# Patient Record
Sex: FEMALE | Race: WHITE | NOT HISPANIC OR LATINO | Employment: FULL TIME | URBAN - METROPOLITAN AREA
[De-identification: names, ages, dates, MRNs, and addresses within clinical notes are randomized per-mention and may not be internally consistent; named-entity substitution may affect disease eponyms.]

---

## 2019-07-16 ENCOUNTER — HOSPITAL ENCOUNTER (OUTPATIENT)
Dept: MAMMOGRAPHY | Facility: HOSPITAL | Age: 49
Discharge: HOME | End: 2019-07-16

## 2019-08-06 ENCOUNTER — HOSPITAL ENCOUNTER (OUTPATIENT)
Dept: ULTRASOUND IMAGING | Facility: HOSPITAL | Age: 49
Discharge: HOME | End: 2019-08-06

## 2020-09-08 ENCOUNTER — OFFICE VISIT CONVERTED (OUTPATIENT)
Dept: FAMILY MEDICINE CLINIC | Age: 50
End: 2020-09-08
Attending: NURSE PRACTITIONER

## 2020-10-19 ENCOUNTER — OFFICE VISIT CONVERTED (OUTPATIENT)
Dept: FAMILY MEDICINE CLINIC | Age: 50
End: 2020-10-19
Attending: NURSE PRACTITIONER

## 2020-10-30 ENCOUNTER — CONVERSION ENCOUNTER (OUTPATIENT)
Dept: FAMILY MEDICINE CLINIC | Age: 50
End: 2020-10-30

## 2020-10-31 LAB
ALBUMIN SERPL-MCNC: 4.3 G/DL
ALBUMIN/GLOB SERPL: 1.5 {RATIO}
ALP SERPL-CCNC: 77 IU/L
ALT SERPL-CCNC: 16 IU/L
AST SERPL-CCNC: 25 IU/L
BASOPHILS # BLD AUTO: 0 X10E3/UL
BASOPHILS NFR BLD AUTO: 1 %
BILIRUB SERPL-MCNC: 0.6 MG/DL
BUN SERPL-MCNC: 13 MG/DL
BUN/CREAT SERPL: 14
CALCIUM SERPL-MCNC: 9.5 MG/DL
CHLORIDE SERPL-SCNC: 102 MMOL/L
CHOLEST SERPL-MCNC: 202 MG/DL
CO2 SERPL-SCNC: 24 MMOL/L
CONV IMMATURE GRAN: 0 %
CONV TOTAL PROTEIN: 7.1 G/DL
CREAT UR-MCNC: 0.96 MG/DL
EOSINOPHIL # BLD AUTO: 0.3 X10E3/UL
EOSINOPHIL # BLD AUTO: 4 %
ERYTHROCYTE [DISTWIDTH] IN BLOOD BY AUTOMATED COUNT: 13.1 %
GLOBULIN UR ELPH-MCNC: 2.8 G/DL
GLUCOSE SERPL-MCNC: 86 MG/DL
HCT VFR BLD AUTO: 37.6 %
HDLC SERPL-MCNC: 73 MG/DL
HGB BLD-MCNC: 12.5 G/DL
IMM GRANULOCYTES # BLD: 0 X10E3/UL
LDLC SERPL CALC-MCNC: 114 MG/DL
LYMPHOCYTES # BLD AUTO: 2.5 X10E3/UL
LYMPHOCYTES NFR BLD AUTO: 38 %
MCH RBC QN AUTO: 27.7 PG
MCHC RBC AUTO-ENTMCNC: 33.2 G/DL
MCV RBC AUTO: 83 FL
MONOCYTES # BLD AUTO: 0.4 X10E3/UL
MONOCYTES NFR BLD AUTO: 7 %
NEUTROPHILS # BLD AUTO: 3.3 X10E3/UL
NEUTROPHILS NFR BLD AUTO: 50 %
PLATELET # BLD AUTO: 292 X10E3/UL
POTASSIUM SERPL-SCNC: 4.2 MMOL/L
RBC # BLD AUTO: 4.51 X10E6/UL
SODIUM SERPL-SCNC: 138 MMOL/L
TRIGL SERPL-MCNC: 82 MG/DL
TSH SERPL-ACNC: 1.21 UIU/ML
VLDLC SERPL-MCNC: 15 MG/DL
WBC # BLD AUTO: 6.5 X10E3/UL

## 2021-05-18 NOTE — PROGRESS NOTES
Josefina Nunez  1970     Office/Outpatient Visit    Visit Date: Mon, Oct 19, 2020 11:11 am    Provider: Amara Garcia N.P. (Assistant: Latha Varner MA)    Location: [facility]        Electronically signed by Amara Garcia N.P. on  10/19/2020 02:42:14 PM                             Subjective:        CC: Ms. Nunez is a 50 year old White female.  This is a follow-up visit.  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  Visit today is because of worsening symptoms.  The diagnosis of depression was made several years ago.  feels like she continues to have problems with her depression/ anxiety  thinks it is mostly due to stress from her job and the current covid situation.  She was previously placed on Wwellbutrin, but has not seen any improvement in her symtpoms.  Would like to try an adjunct , but is concerned with sexual side effects that the other medications seem to cause           Migraine without aura, not intractable, without status migrainosus details; josefina would like to get back on topirimate for her migraines.  She was previously on a nightly Rx which she felt worked well.  She is unsure if this was 25 or 50mg  but she has been having increasing problems with her migraines again.      ROS:     CONSTITUTIONAL:  Positive for fatigue.   Negative for chills or fever.      CARDIOVASCULAR:  Positive for palpitations ( with stress ).   Negative for chest pain or pedal edema.      RESPIRATORY:  Negative for recent cough and dyspnea.      NEUROLOGICAL:  Positive for headaches ( migraine ).   Negative for dizziness.      PSYCHIATRIC:  Positive for anxiety, depression, feelings of stress, mood swings, difficulty concentrating and sleep disturbance.   Negative for suicidal thoughts.          Past Medical History / Family History / Social History:         Last Reviewed on 9/08/2020 02:43 PM by  Amara Garcia    Past Medical History:                 PAST MEDICAL HISTORY             GYNECOLOGICAL HISTORY:             PREVENTIVE HEALTH MAINTENANCE             COLORECTAL CANCER SCREENING: never  and would like one now     DENTAL CLEANING: was last done      EYE EXAM: was last done      MAMMOGRAM: was last done 2017 fibrocystic breast  - [facility] (tyicallyrequires )     PAP SMEAR: was last done  with the following abnormalaties noted-- colposcopy - last normal Dr. Yanes         Surgical History:         Bilateral Tubal Ligation Procedures: UTERINE ABLATION     Cholecystectomy: laparoscopic; [facility];         Family History:         Positive for Myocardial Infarction ( mat. GM ).      Positive for Brain Tumor ( mat. GM ), Breast Cancer ( mat. GM ), Colon Cancer ( father --  at 71 ) and uterine / bladder cancer  (mother).          Social History:     Occupation: [employer]     Children: 1 child         Tobacco/Alcohol/Supplements:     Last Reviewed on 2020 02:43 PM by Amara Garcia    Tobacco: She has a past history of cigarette smoking; quit date:  4-5 years ago.          Alcohol: Frequency:    RARE;     Caffeine:  She admits to consuming caffeine via coffee ( 1 serving per day ).          Substance Abuse History:     Last Reviewed on 2020 02:43 PM by Amara Garcia    NEGATIVE         Mental Health History:     Last Reviewed on 2020 02:43 PM by Amara Garcia        Generalized Anxiety Disorder     Major Depression         Communicable Diseases (eg STDs):     Last Reviewed on 2020 02:43 PM by Amara Garcia    Reportable health conditions; NEGATIVE         Current Problems:     Last Reviewed on 2020 02:43 PM by Amara Garcia    Generalized anxiety disorder    Migraine without aura, not intractable, without status migrainosus    Insomnia, unspecified    Calculus of kidney    Encounter for screening for malignant neoplasm of colon     Encounter for screening mammogram for malignant neoplasm of breast    Encounter for screening for depression    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, unspecified        Immunizations:     PPD 2/11/2009        Allergies:     Last Reviewed on 10/19/2020 11:16 AM by Latha Varner    Sulfonamides:      Imitrex:      Morphine Sulfate: Rash     Codeine Sulfate: Rash         Current Medications:     Last Reviewed on 10/19/2020 11:16 AM by Latha Varner    black cohosh  [430mg daily]    cyclobenzaprine 10 mg oral tablet [take 1/2 - 1 tablet (10 mg) by oral route HS prn]    buPROPion  mg oral Tablet, Extended Release 24 hr [take 1 tablet (150 mg) by oral route once daily]        Objective:        Vitals:         Current: 10/19/2020 11:18:24 AM    Ht:  5 ft, 5 in;  Wt: 124.8 lbs;  BMI: 20.8T: 97.4 F (temporal);  BP: 118/70 mm Hg (right arm, sitting);  P: 73 bpm (right arm (BP Cuff), sitting)        Exams:     PHYSICAL EXAM:     GENERAL: vital signs recorded - well developed, well nourished;  no apparent distress;     EYES: PERRL, EOMI     RESPIRATORY: normal appearance and symmetric expansion of chest wall; normal respiratory rate and pattern with no distress; normal breath sounds with no rales, rhonchi, wheezes or rubs;     CARDIOVASCULAR: normal rate; rhythm is regular;  no edema;     MUSCULOSKELETAL: normal gait; normal range of motion of all major muscle groups; no limb or joint pain with range of motion;     NEUROLOGIC: mental status: alert and oriented x 3;     PSYCHIATRIC: appropriate affect and demeanor; normal speech pattern; normal thought and perception;         Assessment:         F06.30   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, unspecified       G43.009   Migraine without aura, not intractable, without status migrainosus       Z13.6   Encounter for screening for cardiovascular disorders           ORDERS:         Meds Prescribed:       [New Rx] DULoxetine 20 mg oral  capsule,delayed release (enteric coated) [take 1 capsule (20 mg) by oral route daily], #90 (ninety) capsules, Refills: 0 (zero)       [New Rx] topiramate 25 mg oral tablet [take 1 tablet (25 mg) by oral route once daily HS], #90 (ninety) tablets, Refills: 0 (zero)       [Refilled] buPROPion  mg oral Tablet, Extended Release 24 hr [take 1-2  tablet (150 mg) by oral route once daily], #180 (one hundred and eighty) tablets, Refills: 0 (zero)         Lab Orders:       [id number]  [facility] PHYSICAL: CMP, CBC, TSH, LIPID: [id number], [id number], [id number]  (Send-Out)                      Plan:         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, unspecifiedinstructed to try to increase the wellbutrin to 300mg  if ineffective, would recommend that she take the duloxetine in addition to wellbutrin to see if this will improvefollow up if no improvement in 4-6 weeks          Prescriptions:       [New Rx] DULoxetine 20 mg oral capsule,delayed release (enteric coated) [take 1 capsule (20 mg) by oral route daily], #90 (ninety) capsules, Refills: 0 (zero)       [Refilled] buPROPion  mg oral Tablet, Extended Release 24 hr [take 1-2  tablet (150 mg) by oral route once daily], #180 (one hundred and eighty) tablets, Refills: 0 (zero)         Migraine without aura, not intractable, without status migrainosusresume topirimate daily at 25mg at HS          Prescriptions:       [New Rx] topiramate 25 mg oral tablet [take 1 tablet (25 mg) by oral route once daily HS], #90 (ninety) tablets, Refills: 0 (zero)         Encounter for screening for cardiovascular disorders    LABORATORY:  Labs ordered to be performed today include PHYSICAL PANEL; CMP, CBC, TSH, LIPID.            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

## 2021-05-18 NOTE — PROGRESS NOTES
Josefina Nunez  1970     Office/Outpatient Visit    Visit Date: Tue, Sep 8, 2020 02:15 pm    Provider: Amara Garcia N.P. (Assistant: Melvi Shane RN)    Location: [facility]        Electronically signed by Amara Garcia N.P. on  09/10/2020 10:39:07 AM                             Subjective:        CC: Ms. Nunez is a 50 year old White female.  This is her first visit to the clinic.  Est New PCP; PT IS NOT TAKING FLUOXETINE        HPI:           PHQ-9 Depression Screening: Completed form scanned and in chart; Total Score 8           Generalized anxiety disorder details; her anxiety disorder was originally diagnosed many years ago.  Her symptom complex includes palpitations.  True panic attacks occur in addition to generalized anxiety.  The frequency symptoms is circumstantial (phobia-related).  Apparent triggers include occupational stressors.  She is not currently being treated for anxiety.  Previous attempts at treatment have included an antidepressant and Wellbutrin.            Complaint of calculus of kidney..  no stones for some time       feels like due to menopausal symtpoms  was previously on wellbutrin 150 daily  would like to resume    ROS:     CONSTITUTIONAL:  Positive for fatigue ( feels like for menopause ).   Negative for chills or fever.      CARDIOVASCULAR:  Negative for chest pain and pedal edema.      RESPIRATORY:  Negative for recent cough and dyspnea.      GASTROINTESTINAL:  Positive for abdominal pain ( LLQ ), abdominal bloating ( after eating ), constipation ( starteda bout 2-3 months ago ) and hematochezia ( nothing recently  but has had in the past ).   Negative for diarrhea, heartburn, nausea or vomiting.      GENITOURINARY:  Negative for dysuria and change in urine stream.      NEUROLOGICAL:  Negative for dizziness, fainting, headaches and paresthesias.      ENDOCRINE:  Positive for hot flashes.   Negative for hair loss, polydipsia or polyphagia.       ALLERGIC/IMMUNOLOGIC:  Negative for seasonal allergies.      PSYCHIATRIC:  Positive for anxiety and sleep disturbance.   Negative for depression or suicidal thoughts.          Past Medical History / Family History / Social History:         Last Reviewed on 2020 02:43 PM by Amara Garcia    Past Medical History:                 PAST MEDICAL HISTORY             GYNECOLOGICAL HISTORY:             PREVENTIVE HEALTH MAINTENANCE             COLORECTAL CANCER SCREENING: never  and would like one now     DENTAL CLEANING: was last done      EYE EXAM: was last done      MAMMOGRAM: was last done  fibrocystic breast  - [facility] ([facility])     PAP SMEAR: was last done  with the following abnormalaties noted-- colposcopy - last normal Dr. Yanes         Surgical History:         Bilateral Tubal Ligation Procedures: UTERINE ABLATION     Cholecystectomy: laparoscopic; [facility];         Family History:         Positive for Myocardial Infarction ( mat. GM ).      Positive for Brain Tumor ( mat. GM ), Breast Cancer ( mat. GM ), Colon Cancer ( father --  at 71 ) and uterine / bladder cancer  (mother).          Social History:     Occupation: [employer]     Children: 1 child         Tobacco/Alcohol/Supplements:     Last Reviewed on 2020 02:43 PM by Amara Garcia    Tobacco: She has a past history of cigarette smoking; quit date:  4-5 years ago.          Alcohol: Frequency:    RARE;     Caffeine:  She admits to consuming caffeine via coffee ( 1 serving per day ).          Substance Abuse History:     Last Reviewed on 2020 02:43 PM by Amara Garcia    NEGATIVE         Mental Health History:     Last Reviewed on 2020 02:43 PM by Amara Garcia        Generalized Anxiety Disorder     Major Depression         Communicable Diseases (eg STDs):     Last Reviewed on 2020 02:43 PM by Amara Garcia    Reportable health conditions; NEGATIVE          Current Problems:     Last Reviewed on 9/08/2020 02:43 PM by Amara Garcia    Generalized anxiety disorder    Migraine without aura, not intractable, without status migrainosus    Insomnia, unspecified    Calculus of kidney    Encounter for screening for malignant neoplasm of colon    Encounter for screening mammogram for malignant neoplasm of breast    Encounter for screening for depression    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, unspecified        Immunizations:     PPD 2/11/2009        Allergies:     Last Reviewed on 9/08/2020 02:43 PM by Amara Garcia    Sulfonamides:      Imitrex:      Morphine Sulfate: Rash     Codeine Sulfate: Rash         Current Medications:     Last Reviewed on 9/08/2020 02:43 PM by Amara Garcia    black cohosh  [430mg daily]        Objective:        Vitals:         Current: 9/8/2020 2:21:12 PM    Ht:  5 ft, 5 in;  Wt: 122.4 lbs;  BMI: 20.4T: 97.6 F (temporal);  BP: 117/82 mm Hg (left arm, sitting);  P: 71 bpm (left arm (BP Cuff), sitting)        Exams:     PHYSICAL EXAM:     GENERAL: vital signs recorded - well developed, well nourished;  no apparent distress;     RESPIRATORY: normal appearance and symmetric expansion of chest wall; normal respiratory rate and pattern with no distress; normal breath sounds with no rales, rhonchi, wheezes or rubs;     CARDIOVASCULAR: normal rate; rhythm is regular;  no edema;     GASTROINTESTINAL: mild epigastric tenderness;     MUSCULOSKELETAL: normal gait; normal range of motion of all major muscle groups; no limb or joint pain with range of motion;     NEUROLOGIC: mental status: alert and oriented x 3;     PSYCHIATRIC: appropriate affect and demeanor; normal speech pattern; normal thought and perception;         Assessment:         Z13.31   Encounter for screening for depression       F41.1   Generalized anxiety disorder       N20.0   Calculus of kidney       F06.30   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,  unspecified       Z12.31   Encounter for screening mammogram for malignant neoplasm of breast       Z12.11   Encounter for screening for malignant neoplasm of colon       G47.00   Insomnia, unspecified           ORDERS:         Meds Prescribed:       [New Rx] cyclobenzaprine 10 mg oral tablet [take 1/2 - 1 tablet (10 mg) by oral route HS prn], #20 (twenty) tablets, Refills: 0 (zero)       [New Rx] buPROPion  mg oral Tablet, Extended Release 24 hr [take 1 tablet (150 mg) by oral route once daily], #30 (thirty) tablets, Refills: 2 (two)         Radiology/Test Orders:       [id number]  Screening mammography bi 2-view inc CAD  (Send-Out)              Procedures Ordered:       REFER  Referral to Specialist or Other Facility  (Send-Out)              Other Orders:         Depression screen positive and follow up plan documented  (In-House)                      Plan:         Encounter for screening for depression    MIPS Positive Depression Screen: Pharmacologic intervention initiated/modified           Orders:         Depression screen positive and follow up plan documented  (In-House)              Generalized anxiety disordermedication as prescribed - follow up if no improvement and may need alternative        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            Orders:       [id number]  Screening mammography bi 2-view inc CAD  (Send-Out)              Encounter for screening for malignant neoplasm of colon        REFERRALS:  Referral initiated to a general surgeon ( a colonoscopy ).            Orders:       REFER  Referral to Specialist or Other Facility  (Send-Out)               Insomnia, unspecifiedwill treat with PRN - has tried trazodone, ambien , hydroxyzine - no benefit          Prescriptions:       [New Rx] cyclobenzaprine 10 mg oral tablet [take 1/2 - 1 tablet (10 mg) by oral route HS prn], #20 (twenty) tablets, Refills: 0 (zero)             Charge Capture:         Primary Diagnosis:     Z13.31  Encounter for screening for depression           Orders:      [id number]  Office visit - new pt, level 3  (In-House)              Depression screen positive and follow up plan documented  (In-House)              F41.1  Generalized anxiety disorder     N20.0  Calculus of kidney     F06.30  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

## 2021-06-24 RX ORDER — BUPROPION HYDROCHLORIDE 150 MG/1
150 TABLET, EXTENDED RELEASE ORAL DAILY
COMMUNITY
End: 2021-06-28 | Stop reason: SDUPTHER

## 2021-06-24 RX ORDER — DULOXETIN HYDROCHLORIDE 20 MG/1
20 CAPSULE, DELAYED RELEASE ORAL DAILY
COMMUNITY
Start: 2021-03-28 | End: 2021-06-28 | Stop reason: SDUPTHER

## 2021-06-24 RX ORDER — TOPIRAMATE 25 MG/1
25 TABLET ORAL NIGHTLY
COMMUNITY
End: 2021-06-28 | Stop reason: SDUPTHER

## 2021-06-24 RX ORDER — CYCLOBENZAPRINE HCL 10 MG
TABLET ORAL
COMMUNITY
Start: 2021-05-23 | End: 2021-06-28 | Stop reason: SDUPTHER

## 2021-06-24 NOTE — TELEPHONE ENCOUNTER
Bupropion  10/19/20 #180    topiramate LF 10/19/20 #90    Cyclobenzaprine LF 3/7/21 #20    LOV 10/19/20   APPT 6/29/21

## 2021-06-28 RX ORDER — TOPIRAMATE 25 MG/1
TABLET ORAL
Qty: 90 TABLET | Refills: 0 | Status: SHIPPED | OUTPATIENT
Start: 2021-06-28 | End: 2021-08-03

## 2021-06-28 RX ORDER — CYCLOBENZAPRINE HCL 10 MG
TABLET ORAL
Qty: 20 TABLET | Refills: 0 | Status: SHIPPED | OUTPATIENT
Start: 2021-06-28 | End: 2021-08-03

## 2021-06-28 RX ORDER — BUPROPION HYDROCHLORIDE 150 MG/1
TABLET ORAL
Qty: 180 TABLET | Refills: 0 | Status: SHIPPED | OUTPATIENT
Start: 2021-06-28 | End: 2021-08-03

## 2021-06-28 RX ORDER — DULOXETIN HYDROCHLORIDE 20 MG/1
20 CAPSULE, DELAYED RELEASE ORAL DAILY
Qty: 30 CAPSULE | Refills: 0 | Status: SHIPPED | OUTPATIENT
Start: 2021-06-28 | End: 2021-08-03

## 2021-06-29 ENCOUNTER — OFFICE VISIT (OUTPATIENT)
Dept: FAMILY MEDICINE CLINIC | Age: 51
End: 2021-06-29

## 2021-06-29 VITALS
HEART RATE: 89 BPM | DIASTOLIC BLOOD PRESSURE: 76 MMHG | HEIGHT: 65 IN | TEMPERATURE: 98.4 F | SYSTOLIC BLOOD PRESSURE: 123 MMHG | WEIGHT: 129 LBS | BODY MASS INDEX: 21.49 KG/M2

## 2021-06-29 DIAGNOSIS — M25.50 ARTHRALGIA, UNSPECIFIED JOINT: ICD-10-CM

## 2021-06-29 DIAGNOSIS — K58.1 IRRITABLE BOWEL SYNDROME WITH CONSTIPATION: Primary | ICD-10-CM

## 2021-06-29 DIAGNOSIS — G47.00 INSOMNIA, UNSPECIFIED TYPE: ICD-10-CM

## 2021-06-29 PROCEDURE — 99214 OFFICE O/P EST MOD 30 MIN: CPT | Performed by: NURSE PRACTITIONER

## 2021-06-29 RX ORDER — DICLOFENAC SODIUM 75 MG/1
75 TABLET, DELAYED RELEASE ORAL 2 TIMES DAILY
Qty: 60 TABLET | Refills: 0 | Status: SHIPPED | OUTPATIENT
Start: 2021-06-29 | End: 2021-08-03

## 2021-06-29 RX ORDER — HYDROXYZINE 50 MG/1
50 TABLET, FILM COATED ORAL NIGHTLY
Qty: 30 TABLET | Refills: 1 | Status: SHIPPED | OUTPATIENT
Start: 2021-06-29 | End: 2021-10-21

## 2021-06-29 NOTE — PATIENT INSTRUCTIONS
Please make sure to schedule a Wellness visit each year with your primary care provider to discuss health screenings, immunization recommendations, routine lab analysis and general health recommendations.   Most insurance covers an annual wellness visit at no cost to you one time per year.  Please call and schedule yours today!

## 2021-06-29 NOTE — PROGRESS NOTES
"Chief Complaint  Constipation and Abdominal Pain    Subjective          Josefina Nunez presents to [facility]   Presents today for problems with long standing constipation.  She feels she may have irritable bowel. She has tried multiple OTC treatment with no improvement long term.  She is requesting linzess.     Diffuse joint pain - mostly in hands and hips.  Morning stiffness lasting 30 min but improves with movement Has tried tylenol, Ibuprofen but only short term      Has also been having problems with sleep. She has taken melatonin, tried the flexeril but nothing is helping her to stay asleep.     Review of Systems   Constitutional: Negative for fatigue and fever.   Respiratory: Negative for shortness of breath.    Cardiovascular: Negative for chest pain.   Gastrointestinal: Positive for abdominal distention (bloating) and constipation (has been going on for months).   Musculoskeletal: Positive for arthralgias (diffuse).   Psychiatric/Behavioral: Negative for depressed mood. The patient is not nervous/anxious.          Health Maintenance Due   Topic Date Due   • COVID-19 Vaccine (1) Never done   • TDAP/TD VACCINES (1 - Tdap) Never done   • ZOSTER VACCINE (1 of 2) Never done   • HEPATITIS C SCREENING  Never done   • ANNUAL WELLNESS VISIT  Never done   • PAP SMEAR  Never done        Objective     Vital Signs:   /76 (BP Location: Right arm, Patient Position: Sitting)   Pulse 89   Temp 98.4 °F (36.9 °C)   Ht 165.1 cm (65\")   Wt 58.5 kg (129 lb)   BMI 21.47 kg/m²       Physical Exam  Constitutional:       General: She is not in acute distress.     Appearance: Normal appearance.   HENT:      Head: Normocephalic.   Cardiovascular:      Rate and Rhythm: Normal rate and regular rhythm.   Pulmonary:      Effort: Pulmonary effort is normal.      Breath sounds: Normal breath sounds.   Abdominal:      General: Bowel sounds are decreased.      Tenderness: There is no abdominal " tenderness.   Musculoskeletal:         General: Normal range of motion.   Neurological:      General: No focal deficit present.      Mental Status: She is alert and oriented to person, place, and time.   Psychiatric:         Mood and Affect: Mood normal.         Behavior: Behavior normal.          Result Review :                      Assessment and Plan    Diagnoses and all orders for this visit:    1. Irritable bowel syndrome with constipation (Primary)  -     linaclotide (Linzess) 72 MCG capsule capsule; Take 1 capsule by mouth Every Morning Before Breakfast for 90 days.  Dispense: 30 capsule; Refill: 1    2. Insomnia, unspecified type  -     hydrOXYzine (ATARAX) 50 MG tablet; Take 1 tablet by mouth Every Night for 30 days. 1/2 -1 tab HS PRN insomnia  Dispense: 30 tablet; Refill: 1    3. Arthralgia, unspecified joint  -     diclofenac (VOLTAREN) 75 MG EC tablet; Take 1 tablet by mouth 2 (Two) Times a Day for 30 days.  Dispense: 60 tablet; Refill: 0            Follow Up    Return in about 6 weeks (around 8/10/2021).      Patient was given instructions and counseling regarding her condition or for health maintenance advice. Please see specific information pulled into the AVS if appropriate.

## 2021-06-30 NOTE — PROGRESS NOTES
Subjective   Josefina Nunez is a 51 y.o. female.     Presents with long standing history of constipation which she suspects is Irritable bowel with constipation.  She reports bloating, chronic constipation with weight gain over the past months.  She has tried multiple OTC treatments to help regulate, with no success.  She is requesting a trial of linzess.     She also reports diffuse joint pain.  This has been ongoing for 2-3 months.  Tried OTC ibuprofen, tylenol with little relief.  She does report morning stiffness that resolves after activity.      Has also noticed that her sleeping medication is not effective any longer.  She is finding it difficult to remain asleep. She will fall asleep, but generally wakes up early in the night     Constipation  Associated symptoms include abdominal pain.   Abdominal Pain  Associated symptoms include arthralgias (diffuse) and constipation.        {Common H&P Review Areas:97755}    Review of Systems   Constitutional: Positive for fatigue and unexpected weight change (gain - 15lbs over the past year ).   Respiratory: Negative for cough.    Gastrointestinal: Positive for abdominal pain and constipation. Negative for blood in stool.   Musculoskeletal: Positive for arthralgias (diffuse). Negative for joint swelling.   Psychiatric/Behavioral: Positive for sleep disturbance.       Objective   Physical Exam  Constitutional:       General: She is not in acute distress.     Appearance: Normal appearance.   HENT:      Head: Normocephalic.   Cardiovascular:      Rate and Rhythm: Normal rate and regular rhythm.   Pulmonary:      Effort: Pulmonary effort is normal.      Breath sounds: Normal breath sounds.   Abdominal:      General: Abdomen is flat. Bowel sounds are decreased.      Palpations: Abdomen is soft.      Tenderness: There is no abdominal tenderness.   Musculoskeletal:         General: Normal range of motion.   Neurological:      General: No focal deficit present.      Mental  Status: She is alert and oriented to person, place, and time.   Psychiatric:         Mood and Affect: Mood normal.         Behavior: Behavior normal.         Assessment/Plan   {Assess/PlanSmartLinks:22823}

## 2021-07-02 VITALS
TEMPERATURE: 97.6 F | SYSTOLIC BLOOD PRESSURE: 117 MMHG | DIASTOLIC BLOOD PRESSURE: 82 MMHG | HEIGHT: 65 IN | BODY MASS INDEX: 20.39 KG/M2 | HEART RATE: 71 BPM | WEIGHT: 122.4 LBS

## 2021-07-02 VITALS
TEMPERATURE: 97.4 F | BODY MASS INDEX: 20.79 KG/M2 | DIASTOLIC BLOOD PRESSURE: 70 MMHG | WEIGHT: 124.8 LBS | HEIGHT: 65 IN | HEART RATE: 73 BPM | SYSTOLIC BLOOD PRESSURE: 118 MMHG

## 2021-07-07 ENCOUNTER — TELEPHONE (OUTPATIENT)
Dept: FAMILY MEDICINE CLINIC | Age: 51
End: 2021-07-07

## 2021-07-07 NOTE — TELEPHONE ENCOUNTER
Caller: Josefina Nunez    Relationship to patient: Self    Best call back number: [phone]    Patient is needing: PATIENT WAS IN LAST WEEK TO SEE ISRA JEFFRIES SHE WAS PRESCRIBED linaclotide (Linzess) 72 MCG  BUT HER PHARMACY NEEDS REAUTHORIZATION FOR MEDICATION. PATIENT HAS BEEN WAITING ABOUT 10 DAYS FOR THIS PRESCRIPTION.     PLEASE ADVISE: [phone]

## 2021-07-08 NOTE — TELEPHONE ENCOUNTER
LMRS to inform pt that PA for linzess has been requested and will inform if/when PA is approved./al

## 2021-07-12 NOTE — TELEPHONE ENCOUNTER
lmrs to inform pt that insurance approved Linzess and that it went through at [facility] pharmacy./al

## 2021-07-20 ENCOUNTER — HOSPITAL ENCOUNTER (EMERGENCY)
Facility: HOSPITAL | Age: 51
Discharge: HOME | End: 2021-07-20
Attending: EMERGENCY MEDICINE | Admitting: EMERGENCY MEDICINE

## 2021-07-20 ENCOUNTER — APPOINTMENT (OUTPATIENT)
Dept: GENERAL RADIOLOGY | Facility: HOSPITAL | Age: 51
End: 2021-07-20

## 2021-07-20 ENCOUNTER — TELEPHONE (OUTPATIENT)
Dept: FAMILY MEDICINE CLINIC | Age: 51
End: 2021-07-20

## 2021-07-20 VITALS
BODY MASS INDEX: 21.41 KG/M2 | SYSTOLIC BLOOD PRESSURE: 118 MMHG | HEIGHT: 65 IN | HEART RATE: 79 BPM | TEMPERATURE: 98 F | RESPIRATION RATE: 18 BRPM | OXYGEN SATURATION: 100 % | DIASTOLIC BLOOD PRESSURE: 68 MMHG | WEIGHT: 128.53 LBS

## 2021-07-20 DIAGNOSIS — S22.41XA CLOSED TRAUMATIC NONDISPLACED FRACTURE OF TWO RIBS OF RIGHT SIDE, INITIAL ENCOUNTER: Primary | ICD-10-CM

## 2021-07-20 PROCEDURE — 99283 EMERGENCY DEPT VISIT LOW MDM: CPT

## 2021-07-20 PROCEDURE — 71101 X-RAY EXAM UNILAT RIBS/CHEST: CPT

## 2021-07-20 RX ORDER — HYDROCODONE BITARTRATE AND ACETAMINOPHEN 5; 325 MG/1; MG/1
1 TABLET ORAL EVERY 6 HOURS PRN
Qty: 12 TABLET | Refills: 0 | Status: SHIPPED | OUTPATIENT
Start: 2021-07-20 | End: 2021-11-12

## 2021-07-20 RX ORDER — HYDROCODONE BITARTRATE AND ACETAMINOPHEN 5; 325 MG/1; MG/1
1 TABLET ORAL EVERY 6 HOURS PRN
Status: DISCONTINUED | OUTPATIENT
Start: 2021-07-20 | End: 2021-07-20 | Stop reason: HOSPADM

## 2021-07-20 RX ORDER — LIDOCAINE 50 MG/G
1 PATCH TOPICAL EVERY 24 HOURS
Qty: 6 PATCH | Refills: 0 | Status: SHIPPED | OUTPATIENT
Start: 2021-07-20 | End: 2021-11-12

## 2021-07-20 RX ADMIN — HYDROCODONE BITARTRATE AND ACETAMINOPHEN 1 TABLET: 5; 325 TABLET ORAL at 13:25

## 2021-07-20 NOTE — TELEPHONE ENCOUNTER
Caller: Josefina Nunez    Relationship: Self    Best call back number: [phone]      What was the call regarding:PATIENT WAS SEEN IN [facility] ER 7-20-21 HAS RIB FRACTURE. WANTS TO SEE IF CAN GET REFILL ON HYDROCODONE 3.25 AND LADACAINE PATCH. PLEASE ADVISE.     Do you require a callback:YES

## 2021-07-20 NOTE — DISCHARGE INSTRUCTIONS
Please use incentive spirometer as discussed. If at any time you become short of air, cannot breath, or develop worsening chest pain, please return to the ER.

## 2021-07-20 NOTE — ED PROVIDER NOTES
Subjective   Patient is here today for right-sided chest and rib pain.  Patient reports hearing a pop in that area while riding a carnival ride last night.  She complains of pain with movement as well as deep breathing.      History provided by:  Patient   used: No    Back Pain  Radiates to:  Does not radiate  Pain severity:  Moderate  Duration: Last night.  Timing:  Constant  Progression:  Worsening  Chronicity:  New  Context comment:  While riding a carnival ride  Worsened by:  Sneezing, movement and deep breathing  Associated symptoms: no abdominal pain, no chest pain, no fever and no headaches        Review of Systems   Constitutional: Negative for chills and fever.   HENT: Negative for congestion, ear pain and sore throat.    Eyes: Negative for pain.   Respiratory: Negative for cough, chest tightness and shortness of breath.    Cardiovascular: Negative for chest pain.   Gastrointestinal: Negative for abdominal pain, diarrhea, nausea and vomiting.   Genitourinary: Negative for flank pain and hematuria.   Musculoskeletal: Negative for joint swelling.        Right-sided chest wall pain and rib pain   Skin: Negative for pallor.   Neurological: Negative for seizures and headaches.   All other systems reviewed and are negative.      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, unspecified        Allergies   Allergen Reactions   • Sulfa Antibiotics Unknown - High Severity   • Sumatriptan Unknown - High Severity       Past Surgical History:   Procedure Laterality Date   • LAPAROSCOPIC CHOLECYSTECTOMY     • LASER ABLATION      UTERINE   • TUBAL ABDOMINAL LIGATION Bilateral        Family History   Problem Relation Age of Onset   • Cancer Mother         Uterine/Bladder   • Colon cancer Father    •  Heart attack Maternal Grandmother    • Other Maternal Grandmother         Brain Tumor   • Breast cancer Maternal Grandmother        Social History     Socioeconomic History   • Marital status: Single     Spouse name: Not on file   • Number of children: 1   • Years of education: Not on file   • Highest education level: Not on file   Tobacco Use   • Smoking status: Former Smoker     Packs/day: 1.00     Years: 10.00     Pack years: 10.00     Types: Cigarettes     Quit date: 2015     Years since quittin.4   • Smokeless tobacco: Never Used   Substance and Sexual Activity   • Alcohol use: Yes     Comment: Rarely   • Drug use: Never           Objective   Physical Exam  Vitals and nursing note reviewed.   Constitutional:       General: She is not in acute distress.     Appearance: Normal appearance. She is not toxic-appearing.   HENT:      Head: Normocephalic and atraumatic.      Mouth/Throat:      Mouth: Mucous membranes are moist.   Eyes:      Extraocular Movements: Extraocular movements intact.      Pupils: Pupils are equal, round, and reactive to light.   Cardiovascular:      Rate and Rhythm: Normal rate and regular rhythm.      Pulses: Normal pulses.      Heart sounds: Normal heart sounds.   Pulmonary:      Effort: Pulmonary effort is normal. No respiratory distress.      Breath sounds: Normal breath sounds. No stridor. No wheezing, rhonchi or rales.      Comments: Pt reports pain with inspiration. Right sided chest wall tenderness.   Chest:      Chest wall: Tenderness present.   Abdominal:      General: Abdomen is flat.      Palpations: Abdomen is soft.      Tenderness: There is no abdominal tenderness.   Musculoskeletal:         General: Normal range of motion.      Cervical back: Normal range of motion and neck supple.      Comments: Patient has increased pain while trying to sit up as well as twisting motion.  PMS intact in all extremities.   Skin:     General: Skin is warm and dry.   Neurological:       Mental Status: She is alert and oriented to person, place, and time. Mental status is at baseline.         Procedures           ED Course  ED Course as of Jul 20 1443   Tue Jul 20, 2021   1414 Noted.    XR Ribs Right With PA Chest [MS]   1423 PT was updated with xray results     [MS]      ED Course User Index  [MS] Latricia Waters APRN                                           MDM  Number of Diagnoses or Management Options     Amount and/or Complexity of Data Reviewed  Tests in the radiology section of CPT®: ordered and reviewed    Risk of Complications, Morbidity, and/or Mortality  Presenting problems: low  Diagnostic procedures: low  Management options: low    Patient Progress  Patient progress: stable      Final diagnoses:   Closed traumatic nondisplaced fracture of two ribs of right side, initial encounter       ED Disposition  ED Disposition     ED Disposition Condition Comment    Discharge Stable           Amara Garcia APRN  [address]  [address]  [facility] [address] [phone]    In 2 days  As needed, If symptoms worsen         Medication List      New Prescriptions    HYDROcodone-acetaminophen 5-325 MG per tablet  Commonly known as: NORCO  Take 1 tablet by mouth Every 6 (Six) Hours As Needed for Moderate Pain .     lidocaine 5 %  Commonly known as: Lidoderm  Place 1 patch on the skin as directed by provider Daily. Remove & Discard patch within 12 hours or as directed by MD           Where to Get Your Medications      These medications were sent to [id number] - [place], [place] - [address] - [phone]  - [phone] FX  [address] [facility] [address]    Phone: [phone]   · HYDROcodone-acetaminophen 5-325 MG per tablet  · lidocaine 5 %          Latricia Waters APRN  07/20/21 9643

## 2021-07-20 NOTE — ED TRIAGE NOTES
Report R rib pain and SOA x 4 days; symptoms started after riding a carnival ride this weekend and felt a pop. Pt reports worsened pain with inspiration and movement, coughing/sneezing

## 2021-07-21 NOTE — TELEPHONE ENCOUNTER
Pt just received a 3 day rx from the er yesterday and is not eligible for a refill until Friday I advised pt that I could schedule her an appt with a provider for Friday or she can call back with an update on her pain and I could ask the on call MD to refill or decline because PCP is out of the office . She declined appt and said she would call back on friday

## 2021-07-23 ENCOUNTER — TELEPHONE (OUTPATIENT)
Dept: FAMILY MEDICINE CLINIC | Age: 51
End: 2021-07-23

## 2021-07-23 NOTE — TELEPHONE ENCOUNTER
Caller: Josefina Nunez    Relationship: Self    Best call back number: [phone]  What is the best time to reach you: ANYTIME    Who are you requesting to speak with (clinical staff, provider,  specific staff member): ISRA MOUSER OR NURSE     What was the call regarding: PATIENT HAS BEEN TO ER AND HAS BEEN TOLD SHE HAS 5TH AND 6TH RIB FACTOR WAS GIVEN 3 DAYS OF PAIN MEDICATION HAS AN APPOINTMENT ON Wednesday FOR FOLLOW UP.  HOWEVER WOULD LIKE TO SEE ABOUT GETTING A FEW MORE DAYS OF PAIN MEDICATION.      Do you require a callback: YES

## 2021-07-27 PROBLEM — F32.A DEPRESSION: Status: ACTIVE | Noted: 2021-07-27

## 2021-07-27 PROBLEM — N30.10 INTERSTITIAL CYSTITIS: Status: ACTIVE | Noted: 2021-07-27

## 2021-07-27 PROBLEM — N20.0 RENAL CALCULUS: Status: ACTIVE | Noted: 2021-07-27

## 2021-07-30 DIAGNOSIS — M25.50 ARTHRALGIA, UNSPECIFIED JOINT: ICD-10-CM

## 2021-08-03 RX ORDER — CYCLOBENZAPRINE HCL 10 MG
TABLET ORAL
Qty: 20 TABLET | Refills: 0 | Status: SHIPPED | OUTPATIENT
Start: 2021-08-03 | End: 2021-09-16 | Stop reason: SDUPTHER

## 2021-08-03 RX ORDER — BUPROPION HYDROCHLORIDE 150 MG/1
TABLET ORAL
Qty: 180 TABLET | Refills: 0 | Status: SHIPPED | OUTPATIENT
Start: 2021-08-03 | End: 2022-05-24

## 2021-08-03 RX ORDER — DICLOFENAC SODIUM 75 MG/1
75 TABLET, DELAYED RELEASE ORAL 2 TIMES DAILY PRN
Qty: 60 TABLET | Refills: 0 | Status: SHIPPED | OUTPATIENT
Start: 2021-08-03 | End: 2021-09-16 | Stop reason: SDUPTHER

## 2021-08-03 RX ORDER — TOPIRAMATE 25 MG/1
TABLET ORAL
Qty: 90 TABLET | Refills: 0 | Status: SHIPPED | OUTPATIENT
Start: 2021-08-03 | End: 2022-04-19

## 2021-08-03 RX ORDER — DULOXETIN HYDROCHLORIDE 20 MG/1
20 CAPSULE, DELAYED RELEASE ORAL DAILY
Qty: 90 CAPSULE | Refills: 0 | Status: SHIPPED | OUTPATIENT
Start: 2021-08-03 | End: 2021-09-16

## 2021-09-15 DIAGNOSIS — K58.1 IRRITABLE BOWEL SYNDROME WITH CONSTIPATION: ICD-10-CM

## 2021-09-15 DIAGNOSIS — M25.50 ARTHRALGIA, UNSPECIFIED JOINT: ICD-10-CM

## 2021-09-15 DIAGNOSIS — G47.00 INSOMNIA, UNSPECIFIED TYPE: ICD-10-CM

## 2021-09-16 RX ORDER — DULOXETIN HYDROCHLORIDE 20 MG/1
CAPSULE, DELAYED RELEASE ORAL
Qty: 90 CAPSULE | Refills: 0 | Status: SHIPPED | OUTPATIENT
Start: 2021-09-16 | End: 2022-01-05

## 2021-09-16 RX ORDER — HYDROXYZINE 50 MG/1
TABLET, FILM COATED ORAL
Qty: 30 TABLET | Refills: 0 | OUTPATIENT
Start: 2021-09-16

## 2021-09-16 RX ORDER — DICLOFENAC SODIUM 75 MG/1
TABLET, DELAYED RELEASE ORAL
Qty: 60 TABLET | Refills: 0 | Status: SHIPPED | OUTPATIENT
Start: 2021-09-16 | End: 2021-10-21

## 2021-09-16 RX ORDER — LINACLOTIDE 72 UG/1
CAPSULE, GELATIN COATED ORAL
Qty: 30 CAPSULE | Refills: 0 | Status: SHIPPED | OUTPATIENT
Start: 2021-09-16 | End: 2021-10-21

## 2021-09-16 RX ORDER — CYCLOBENZAPRINE HCL 10 MG
TABLET ORAL
Qty: 20 TABLET | Refills: 0 | Status: SHIPPED | OUTPATIENT
Start: 2021-09-16 | End: 2021-10-21

## 2021-09-21 ENCOUNTER — APPOINTMENT (OUTPATIENT)
Dept: GENERAL RADIOLOGY | Facility: HOSPITAL | Age: 51
End: 2021-09-21

## 2021-09-21 ENCOUNTER — HOSPITAL ENCOUNTER (EMERGENCY)
Facility: HOSPITAL | Age: 51
Discharge: HOME | End: 2021-09-21
Attending: EMERGENCY MEDICINE | Admitting: EMERGENCY MEDICINE

## 2021-09-21 VITALS
RESPIRATION RATE: 18 BRPM | SYSTOLIC BLOOD PRESSURE: 142 MMHG | HEIGHT: 65 IN | DIASTOLIC BLOOD PRESSURE: 76 MMHG | BODY MASS INDEX: 20.97 KG/M2 | OXYGEN SATURATION: 99 % | WEIGHT: 125.88 LBS | TEMPERATURE: 98.3 F | HEART RATE: 86 BPM

## 2021-09-21 DIAGNOSIS — S20.211A CHEST WALL CONTUSION, RIGHT, INITIAL ENCOUNTER: Primary | ICD-10-CM

## 2021-09-21 PROCEDURE — 71101 X-RAY EXAM UNILAT RIBS/CHEST: CPT

## 2021-09-21 PROCEDURE — 99283 EMERGENCY DEPT VISIT LOW MDM: CPT

## 2021-09-21 RX ORDER — HYDROCODONE BITARTRATE AND ACETAMINOPHEN 5; 325 MG/1; MG/1
1 TABLET ORAL 4 TIMES DAILY PRN
Qty: 12 TABLET | Refills: 0 | Status: SHIPPED | OUTPATIENT
Start: 2021-09-21 | End: 2021-11-12

## 2021-09-21 RX ORDER — HYDROCODONE BITARTRATE AND ACETAMINOPHEN 10; 325 MG/1; MG/1
1 TABLET ORAL EVERY 6 HOURS PRN
Status: DISCONTINUED | OUTPATIENT
Start: 2021-09-21 | End: 2021-09-21 | Stop reason: HOSPADM

## 2021-09-21 RX ADMIN — HYDROCODONE BITARTRATE AND ACETAMINOPHEN 1 TABLET: 10; 325 TABLET ORAL at 16:23

## 2021-09-21 NOTE — ED PROVIDER NOTES
Subjective   Patient complained of right lower rib pain since Saturday which is approximately 3 days ago.  Patient states she was sitting on a barstool and reaching when she lost balance hitting her ribs on a countertop.  Patient states since incident she has been having right-sided chest wall/rib pain worse with movement, tender to touch.  Patient states has broken her right ribs in the past while on a roller coaster.      History provided by:  Patient   used: No    Trauma  Mechanism of injury: fall  Injury location: torso  Injury location detail: R chest  Time since incident: About 3 days.  Arrived directly from scene: no     Current symptoms:       Associated symptoms:             Denies abdominal pain, chest pain, headache, nausea, seizures and vomiting.       Review of Systems   Constitutional: Negative for chills and fever.   HENT: Negative for congestion, ear pain and sore throat.    Eyes: Negative for pain.   Respiratory: Negative for cough, chest tightness and shortness of breath.    Cardiovascular: Negative for chest pain.   Gastrointestinal: Negative for abdominal pain, diarrhea, nausea and vomiting.   Genitourinary: Negative for flank pain and hematuria.   Musculoskeletal: Negative for joint swelling.        Patient complaining of right lower anterior chest wall pain for 3 days.   Skin: Negative for pallor.   Neurological: Negative for seizures and headaches.   All other systems reviewed and are negative.      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, unspecified        Allergies   Allergen Reactions   • Sulfa Antibiotics Unknown - High Severity   • Sumatriptan Unknown - High Severity       Past Surgical History:   Procedure Laterality Date   • LAPAROSCOPIC CHOLECYSTECTOMY     •  LASER ABLATION      UTERINE   • TUBAL ABDOMINAL LIGATION Bilateral        Family History   Problem Relation Age of Onset   • Cancer Mother         Uterine/Bladder   • Colon cancer Father    • Heart attack Maternal Grandmother    • Other Maternal Grandmother         Brain Tumor   • Breast cancer Maternal Grandmother        Social History     Socioeconomic History   • Marital status: Single     Spouse name: Not on file   • Number of children: 1   • Years of education: Not on file   • Highest education level: Not on file   Tobacco Use   • Smoking status: Former Smoker     Packs/day: 1.00     Years: 10.00     Pack years: 10.00     Types: Cigarettes     Quit date: 2015     Years since quittin.6   • Smokeless tobacco: Never Used   Substance and Sexual Activity   • Alcohol use: Yes     Comment: Rarely   • Drug use: Never           Objective   Physical Exam  Vitals and nursing note reviewed.   Constitutional:       General: She is not in acute distress.     Appearance: Normal appearance. She is not toxic-appearing.   HENT:      Head: Normocephalic and atraumatic.      Mouth/Throat:      Mouth: Mucous membranes are moist.   Eyes:      Extraocular Movements: Extraocular movements intact.      Pupils: Pupils are equal, round, and reactive to light.   Cardiovascular:      Rate and Rhythm: Normal rate and regular rhythm.      Pulses: Normal pulses.      Heart sounds: Normal heart sounds.   Pulmonary:      Effort: Pulmonary effort is normal. No respiratory distress.      Breath sounds: Normal breath sounds.   Chest:       Abdominal:      General: Abdomen is flat.      Palpations: Abdomen is soft.      Tenderness: There is no abdominal tenderness.   Musculoskeletal:         General: Normal range of motion.      Cervical back: Normal range of motion and neck supple.   Skin:     General: Skin is warm and dry.   Neurological:      Mental Status: She is alert and oriented to person, place, and time. Mental status is at  baseline.         Procedures           ED Course                                           MDM  Number of Diagnoses or Management Options  Diagnosis management comments: I have spoken with Ms. Nunez. I have explained the patient´s condition, diagnoses and treatment plan based on the information available to me at this time. I have answered the patient's questions and addressed any concerns. The patient has a good  understanding of the patient´s diagnosis, condition, and treatment plan as can be expected at this point. The vital signs have been stable. The patient´s condition is stable and appropriate for discharge from the emergency department.      The patient will pursue further outpatient evaluation with the primary care physician or other designated or consulting physician as outlined in the discharge instructions. They are agreeable to this plan of care and follow-up instructions have been explained in detail. The patient has received these instructions in written format and have expressed an understanding of the discharge instructions. The patient is aware that any significant change in condition or worsening of symptoms should prompt an immediate return to this or the closest emergency department or call to 911.       Amount and/or Complexity of Data Reviewed  Tests in the radiology section of CPT®: reviewed    Risk of Complications, Morbidity, and/or Mortality  Presenting problems: low  Diagnostic procedures: low  Management options: low    Patient Progress  Patient progress: stable      Final diagnoses:   Chest wall contusion, right, initial encounter       ED Disposition  ED Disposition     ED Disposition Condition Comment    Discharge Stable           Amara Garcia, APRN  [address] [place] [address] [phone]    Schedule an appointment as soon as possible for a visit in 3 days           Medication List      Changed    * HYDROcodone-acetaminophen 5-325 MG per  tablet  Commonly known as: NORCO  Take 1 tablet by mouth Every 6 (Six) Hours As Needed for Moderate Pain .  What changed: Another medication with the same name was added. Make sure you understand how and when to take each.     * HYDROcodone-acetaminophen 5-325 MG per tablet  Commonly known as: NORCO  Take 1 tablet by mouth 4 (Four) Times a Day As Needed for Moderate Pain  for up to 12 doses.  What changed: You were already taking a medication with the same name, and this prescription was added. Make sure you understand how and when to take each.         * This list has 2 medication(s) that are the same as other medications prescribed for you. Read the directions carefully, and ask your doctor or other care provider to review them with you.               Where to Get Your Medications      These medications were sent to [facility] - [place] - [address] - [phone] [facility] [phone]   [address] [facility] [address]    Phone: [phone]   · HYDROcodone-acetaminophen 5-325 MG per tablet          Anthony Jimenez APRN  09/21/21 5600

## 2021-09-22 ENCOUNTER — OFFICE VISIT (OUTPATIENT)
Dept: FAMILY MEDICINE CLINIC | Age: 51
End: 2021-09-22

## 2021-09-22 VITALS
HEIGHT: 65 IN | SYSTOLIC BLOOD PRESSURE: 127 MMHG | TEMPERATURE: 98 F | BODY MASS INDEX: 21.66 KG/M2 | DIASTOLIC BLOOD PRESSURE: 77 MMHG | HEART RATE: 78 BPM | WEIGHT: 130 LBS

## 2021-09-22 DIAGNOSIS — S22.41XG CLOSED FRACTURE OF MULTIPLE RIBS OF RIGHT SIDE WITH DELAYED HEALING, SUBSEQUENT ENCOUNTER: Primary | ICD-10-CM

## 2021-09-22 DIAGNOSIS — M85.852 OSTEOPENIA OF NECKS OF BOTH FEMURS: ICD-10-CM

## 2021-09-22 DIAGNOSIS — M81.0 OSTEOPOROSIS OF LUMBAR SPINE: ICD-10-CM

## 2021-09-22 DIAGNOSIS — M85.851 OSTEOPENIA OF NECKS OF BOTH FEMURS: ICD-10-CM

## 2021-09-22 PROCEDURE — 96372 THER/PROPH/DIAG INJ SC/IM: CPT | Performed by: NURSE PRACTITIONER

## 2021-09-22 PROCEDURE — 99213 OFFICE O/P EST LOW 20 MIN: CPT | Performed by: NURSE PRACTITIONER

## 2021-09-22 RX ORDER — KETOROLAC TROMETHAMINE 30 MG/ML
60 INJECTION, SOLUTION INTRAMUSCULAR; INTRAVENOUS ONCE
Status: COMPLETED | OUTPATIENT
Start: 2021-09-22 | End: 2021-09-22

## 2021-09-22 RX ORDER — METHYLPREDNISOLONE 4 MG/1
TABLET ORAL
Qty: 21 TABLET | Refills: 1 | Status: SHIPPED | OUTPATIENT
Start: 2021-09-22 | End: 2022-03-31

## 2021-09-22 RX ADMIN — KETOROLAC TROMETHAMINE 60 MG: 30 INJECTION, SOLUTION INTRAMUSCULAR; INTRAVENOUS at 17:15

## 2021-09-22 NOTE — PROGRESS NOTES
Chief Complaint  Josefina Nunez presents to [facility] for Rib Pain    Subjective          Right rib pain  On 9/20, Josefina reports falling off of a stool at home and hitting the edge of the counter and having extreme pain.  The next day, the pain was severe, went to [facility] ER and found to have 2 fractured ribs  - taking norco,  Not working well   She is having difficulty taking in deep breaths.  She reports that this medication is not helping with her pain.          Review of Systems   Constitutional: Negative for fatigue and fever.   Respiratory: Negative for shortness of breath.    Cardiovascular: Negative for chest pain.   Musculoskeletal: Positive for arthralgias (right rib anterior/lateral pain with inspiration and movement ).   Psychiatric/Behavioral: Negative for depressed mood. The patient is not nervous/anxious.          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, unspecified      Current Outpatient Medications   Medication Sig Dispense Refill   • buPROPion XL (WELLBUTRIN XL) 150 MG 24 hr tablet TAKE 1 TO 2 TABLETS BY MOUTH ONCE DAILY 180 tablet 0   • cyclobenzaprine (FLEXERIL) 10 MG tablet TAKE 1/2 TO 1 (ONE-HALF TO ONE) TABLET BY MOUTH ONCE DAILY AT BEDTIME AS NEEDED 20 tablet 0   • diclofenac (VOLTAREN) 75 MG EC tablet Take 1 tablet by mouth twice daily as needed 60 tablet 0   • DULoxetine (CYMBALTA) 20 MG capsule Take 1 capsule by mouth once daily 90 capsule 0   • HYDROcodone-acetaminophen (NORCO) 5-325 MG per tablet Take 1 tablet by mouth Every 6 (Six) Hours As Needed for Moderate Pain . 12 tablet 0   • HYDROcodone-acetaminophen (NORCO)  5-325 MG per tablet Take 1 tablet by mouth 4 (Four) Times a Day As Needed for Moderate Pain  for up to 12 doses. 12 tablet 0   • lidocaine (Lidoderm) 5 % Place 1 patch on the skin as directed by provider Daily. Remove & Discard patch within 12 hours or as directed by MD 6 patch 0   • Linzess 72 MCG capsule capsule TAKE 1 CAPSULE BY MOUTH IN THE MORNING BEFORE BREAKFAST 30 capsule 0   • topiramate (TOPAMAX) 25 MG tablet TAKE 1 TABLET BY MOUTH ONCE DAILY AT BEDTIME 90 tablet 0   • methylPREDNISolone (MEDROL) 4 MG dose pack Take as directed on package instructions. 21 tablet 1     No current facility-administered medications for this visit.     Past Surgical History:   Procedure Laterality Date   • LAPAROSCOPIC CHOLECYSTECTOMY     • LASER ABLATION      UTERINE   • TUBAL ABDOMINAL LIGATION Bilateral       Social History     Tobacco Use   • Smoking status: Former Smoker     Packs/day: 1.00     Years: 10.00     Pack years: 10.00     Types: Cigarettes     Quit date: 2015     Years since quittin.6   • Smokeless tobacco: Never Used   Substance Use Topics   • Alcohol use: Yes     Comment: Rarely   • Drug use: Never     Family History   Problem Relation Age of Onset   • Cancer Mother         Uterine/Bladder   • Colon cancer Father    • Heart attack Maternal Grandmother    • Other Maternal Grandmother         Brain Tumor   • Breast cancer Maternal Grandmother      Health Maintenance Due   Topic Date Due   • ANNUAL PHYSICAL  Never done   • COVID-19 Vaccine (1) Never done   • TDAP/TD VACCINES (1 - Tdap) Never done   • ZOSTER VACCINE (1 of 2) Never done   • HEPATITIS C SCREENING  Never done   • PAP SMEAR  Never done      Immunization History   Administered Date(s) Administered   • PPD Test 2009        Objective     Vitals:    21 1638   BP: 127/77   BP Location: Left arm   Patient Position: Sitting   Cuff Size: Large Adult   Pulse: 78   Temp: 98 °F (36.7 °C)   TempSrc: Oral   Weight: 59 kg (130 lb)   Height:  "165.1 cm (65\")     Body mass index is 21.63 kg/m².     Physical Exam  Constitutional:       General: She is in acute distress (pain).      Appearance: Normal appearance.   HENT:      Head: Normocephalic.   Cardiovascular:      Rate and Rhythm: Normal rate and regular rhythm.   Pulmonary:      Effort: Pulmonary effort is normal.      Breath sounds: Normal breath sounds.   Musculoskeletal:         General: Normal range of motion.        Arms:    Neurological:      General: No focal deficit present.      Mental Status: She is alert and oriented to person, place, and time.   Psychiatric:         Mood and Affect: Mood normal.         Behavior: Behavior normal.           Result Review :                               Assessment and Plan      Diagnoses and all orders for this visit:    1. Closed fracture of multiple ribs of right side with delayed healing, subsequent encounter (Primary)  Comments:  will try Medrol pack/ splinting - may need to consider additional imaging if no imrpvement.  will get DEXA due to fracture with minimal injury/impact   Orders:  -     ketorolac (TORADOL) injection 60 mg  -     methylPREDNISolone (MEDROL) 4 MG dose pack; Take as directed on package instructions.  Dispense: 21 tablet; Refill: 1  -     DEXA Bone Density Axial; Future              Follow Up     Return if symptoms worsen or fail to improve.             "

## 2021-09-24 ENCOUNTER — TELEPHONE (OUTPATIENT)
Dept: FAMILY MEDICINE CLINIC | Age: 51
End: 2021-09-24

## 2021-09-24 DIAGNOSIS — S22.41XG CLOSED FRACTURE OF MULTIPLE RIBS OF RIGHT SIDE WITH DELAYED HEALING, SUBSEQUENT ENCOUNTER: Primary | ICD-10-CM

## 2021-09-24 DIAGNOSIS — R07.89 ANTERIOR CHEST WALL PAIN: ICD-10-CM

## 2021-09-24 NOTE — TELEPHONE ENCOUNTER
I called pt and inf her that mouser is out of the office and she would need an appt and there are none avaliable today she asked me to send to PCP and if she addresses this weekend she can send pt a [employer] message

## 2021-09-24 NOTE — TELEPHONE ENCOUNTER
Caller: Josefina Nunez    Relationship: Self    Best call back number: [phone]    What medication are you requesting: PAIN MEDICATION    What are your current symptoms: PAIN IN RIB AREA. PATIENT STATES THAT IT IS BAD ENOUGH SHE IS HAVING TROUBLE SLEEPING    How long have you been experiencing symptoms:   A FEW DAYS.     If a prescription is needed, what is your preferred pharmacy and phone number: [facility] [place], [place] - [address] [facility] [phone] [facility] [phone] FX     Additional notes:FEELS LIKE HER CARTILAGE IS RE-INJURED. HAD NORCO FROM [facility] AND SHE SAID THAT IT IS NOT HELPING THE PAIN. SHE WOULD LIKE TO HAVE AN MRI REFERRAL AS WELL.

## 2021-10-15 ENCOUNTER — HOSPITAL ENCOUNTER (OUTPATIENT)
Dept: MRI IMAGING | Facility: HOSPITAL | Age: 51
Discharge: HOME | End: 2021-10-15
Admitting: NURSE PRACTITIONER

## 2021-10-15 DIAGNOSIS — R07.89 ANTERIOR CHEST WALL PAIN: ICD-10-CM

## 2021-10-15 DIAGNOSIS — S22.41XG CLOSED FRACTURE OF MULTIPLE RIBS OF RIGHT SIDE WITH DELAYED HEALING, SUBSEQUENT ENCOUNTER: ICD-10-CM

## 2021-10-15 PROCEDURE — 71550 MRI CHEST W/O DYE: CPT

## 2021-10-20 DIAGNOSIS — G47.00 INSOMNIA, UNSPECIFIED TYPE: ICD-10-CM

## 2021-10-20 DIAGNOSIS — M25.50 ARTHRALGIA, UNSPECIFIED JOINT: ICD-10-CM

## 2021-10-20 DIAGNOSIS — K58.1 IRRITABLE BOWEL SYNDROME WITH CONSTIPATION: ICD-10-CM

## 2021-10-21 ENCOUNTER — HOSPITAL ENCOUNTER (OUTPATIENT)
Dept: BONE DENSITY | Facility: HOSPITAL | Age: 51
Discharge: HOME | End: 2021-10-21
Admitting: NURSE PRACTITIONER

## 2021-10-21 DIAGNOSIS — S22.41XG CLOSED FRACTURE OF MULTIPLE RIBS OF RIGHT SIDE WITH DELAYED HEALING, SUBSEQUENT ENCOUNTER: ICD-10-CM

## 2021-10-21 PROBLEM — M85.80 OSTEOPENIA: Status: ACTIVE | Noted: 2021-10-21

## 2021-10-21 PROBLEM — M81.0 OSTEOPOROSIS OF LUMBAR SPINE: Status: ACTIVE | Noted: 2021-10-21

## 2021-10-21 PROBLEM — M85.859 OSTEOPENIA OF FEMORAL NECK: Status: ACTIVE | Noted: 2021-10-21

## 2021-10-21 PROCEDURE — 77080 DXA BONE DENSITY AXIAL: CPT

## 2021-10-21 RX ORDER — HYDROXYZINE 50 MG/1
TABLET, FILM COATED ORAL
Qty: 30 TABLET | Refills: 0 | Status: SHIPPED | OUTPATIENT
Start: 2021-10-21 | End: 2022-01-05

## 2021-10-21 RX ORDER — LINACLOTIDE 72 UG/1
CAPSULE, GELATIN COATED ORAL
Qty: 30 CAPSULE | Refills: 11 | Status: SHIPPED | OUTPATIENT
Start: 2021-10-21 | End: 2023-03-13 | Stop reason: DRUGHIGH

## 2021-10-21 RX ORDER — DICLOFENAC SODIUM 75 MG/1
TABLET, DELAYED RELEASE ORAL
Qty: 60 TABLET | Refills: 0 | Status: SHIPPED | OUTPATIENT
Start: 2021-10-21 | End: 2022-01-05

## 2021-10-21 RX ORDER — CYCLOBENZAPRINE HCL 10 MG
TABLET ORAL
Qty: 20 TABLET | Refills: 0 | Status: SHIPPED | OUTPATIENT
Start: 2021-10-21 | End: 2022-01-05

## 2021-11-12 ENCOUNTER — TELEMEDICINE (OUTPATIENT)
Dept: FAMILY MEDICINE CLINIC | Age: 51
End: 2021-11-12

## 2021-11-12 DIAGNOSIS — M81.0 OSTEOPOROSIS OF LUMBAR SPINE: ICD-10-CM

## 2021-11-12 DIAGNOSIS — S22.41XG CLOSED FRACTURE OF MULTIPLE RIBS OF RIGHT SIDE WITH DELAYED HEALING, SUBSEQUENT ENCOUNTER: Primary | ICD-10-CM

## 2021-11-12 DIAGNOSIS — R07.89 ANTERIOR CHEST WALL PAIN: ICD-10-CM

## 2021-11-12 DIAGNOSIS — M80.00XD PATHOLOGICAL FRACTURE DUE TO OSTEOPOROSIS WITH ROUTINE HEALING, UNSPECIFIED FRACTURE SITE, UNSPECIFIED OSTEOPOROSIS TYPE, SUBSEQUENT ENCOUNTER: ICD-10-CM

## 2021-11-12 PROCEDURE — 99214 OFFICE O/P EST MOD 30 MIN: CPT | Performed by: NURSE PRACTITIONER

## 2021-11-12 RX ORDER — LIDOCAINE 50 MG/G
1 PATCH TOPICAL EVERY 24 HOURS
Qty: 30 EACH | Refills: 0 | Status: SHIPPED | OUTPATIENT
Start: 2021-11-12 | End: 2021-11-22 | Stop reason: SDUPTHER

## 2021-11-12 NOTE — PROGRESS NOTES
Chief Complaint  Josefina Nunez presents to [facility] for No chief complaint on file.    Subjective          Mode of Visit: Video  You have chosen to receive care through a telehealth visit.  Do you consent to use a video/audio connection for your medical care today? YES   Identity has been verified with 2 identifiers - (name and date of birth).    The visit included audio and video interaction. Patient is currently located : home and provider located :  office   No technical issues occurred during this visit.     Follow up on fracture of ribs.  Felt like this time she may have broke a rib again just while sleep  Right side  Sternum level     Has been taking Biotin, black cohosh and MVI to help.  She is a former smoker  She did recently have DEXA noting to have osteoporosis and is scheduled to discuss further with endocrinology for recommendations possible underlying cause given her young age.                 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, unspecified      Current Outpatient Medications   Medication Sig Dispense Refill   • buPROPion XL (WELLBUTRIN XL) 150 MG 24 hr tablet TAKE 1 TO 2 TABLETS BY MOUTH ONCE DAILY 180 tablet 0   • cyclobenzaprine (FLEXERIL) 10 MG tablet TAKE 1/2 TO 1 TABLET BY MOUTH ONCE DAILY AT BEDTIME AS NEEDED 20 tablet 0   • diclofenac (VOLTAREN) 75 MG EC tablet Take 1 tablet by mouth twice daily as needed 60 tablet 0   • DULoxetine (CYMBALTA) 20 MG capsule Take 1 capsule by mouth once daily 90 capsule 0   • hydrOXYzine (ATARAX) 50 MG tablet TAKE 1 TABLET BY MOUTH AT BEDTIME AS NEEDED FOR INSOMNIA 30  tablet 0   • ketorolac (TORADOL) 10 MG tablet Take 1 tablet by mouth Every 6 (Six) Hours As Needed for Moderate Pain . 10 tablet 0   • lidocaine (LIDODERM) 5 % Place 1 patch on the skin as directed by provider Daily. Remove & Discard patch within 12 hours or as directed by MD 30 each 0   • Linzess 72 MCG capsule capsule TAKE 1 CAPSULE BY MOUTH IN THE MORNING BEFORE BREAKFAST 30 capsule 11   • methylPREDNISolone (MEDROL) 4 MG dose pack Take as directed on package instructions. 21 tablet 1   • topiramate (TOPAMAX) 25 MG tablet TAKE 1 TABLET BY MOUTH ONCE DAILY AT BEDTIME 90 tablet 0   • traMADol (ULTRAM) 50 MG tablet Take 1 tablet by mouth Every 6 (Six) Hours As Needed for Moderate Pain . 12 tablet 0     No current facility-administered medications for this visit.     Past Surgical History:   Procedure Laterality Date   • LAPAROSCOPIC CHOLECYSTECTOMY     • LASER ABLATION      UTERINE   • TUBAL ABDOMINAL LIGATION Bilateral       Social History     Tobacco Use   • Smoking status: Former Smoker     Packs/day: 1.00     Years: 10.00     Pack years: 10.00     Types: Cigarettes     Quit date: 2015     Years since quittin.8   • Smokeless tobacco: Never Used   Substance Use Topics   • Alcohol use: Yes     Comment: Rarely   • Drug use: Never     Family History   Problem Relation Age of Onset   • Cancer Mother         Uterine/Bladder   • Colon cancer Father    • Heart attack Maternal Grandmother    • Other Maternal Grandmother         Brain Tumor   • Breast cancer Maternal Grandmother      Health Maintenance Due   Topic Date Due   • ANNUAL PHYSICAL  Never done   • COVID-19 Vaccine (1) Never done   • TDAP/TD VACCINES (1 - Tdap) Never done   • ZOSTER VACCINE (1 of 2) Never done   • HEPATITIS C SCREENING  Never done   • PAP SMEAR  Never done   • INFLUENZA VACCINE  Never done      Immunization History   Administered Date(s) Administered   • PPD Test 2009, 2009        Objective     There were no vitals filed for  this visit.  There is no height or weight on file to calculate BMI.     Physical Exam  Constitutional:       Appearance: Normal appearance.   HENT:      Head: Normocephalic.   Musculoskeletal:         General: Normal range of motion.      Cervical back: Normal range of motion.   Neurological:      General: No focal deficit present.      Mental Status: She is alert and oriented to person, place, and time.   Psychiatric:         Mood and Affect: Mood normal.         Thought Content: Thought content normal.           Result Review :                               Assessment and Plan      Diagnoses and all orders for this visit:    1. Closed fracture of multiple ribs of right side with delayed healing, subsequent encounter (Primary)  Comments:  Will get images, recomend follow up with endo as scheduled   Orders:  -     Vitamin D 25 hydroxy; Future  -     GABRIEL; Future  -     Vitamin B12; Future  -     Comprehensive metabolic panel; Future  -     CBC No Differential; Future  -     Sedimentation rate, automated; Future  -     C-reactive protein; Future  -     NM Bone Scan Whole Body; Future    2. Anterior chest wall pain  Comments:  topical lidoderm patches, may need to consider NSAIDs - avoid any further treatment with steroids if possible   Orders:  -     Discontinue: lidocaine (LIDODERM) 5 %; Place 1 patch on the skin as directed by provider Daily. Remove & Discard patch within 12 hours or as directed by MD  Dispense: 30 each; Refill: 0  -     Cancel: POC Urine Drug Screen [facility]    3. Pathological fracture due to osteoporosis with routine healing, unspecified fracture site, unspecified osteoporosis type, subsequent encounter  Comments:  total body bone scan to evaluate for possible cause, will check some labs as well   Orders:  -     Vitamin D 25 hydroxy; Future  -     GABRIEL; Future  -     Vitamin B12; Future  -     Comprehensive metabolic panel; Future  -     CBC No Differential; Future  -     Sedimentation rate,  automated; Future  -     C-reactive protein; Future    4. Osteoporosis of lumbar spine  Comments:  pending referral to endo              Follow Up     Return if symptoms worsen or fail to improve, for Pending imaging results, Pending lab results.

## 2021-11-15 ENCOUNTER — HOSPITAL ENCOUNTER (EMERGENCY)
Facility: HOSPITAL | Age: 51
Discharge: HOME | End: 2021-11-15
Attending: EMERGENCY MEDICINE | Admitting: EMERGENCY MEDICINE

## 2021-11-15 ENCOUNTER — APPOINTMENT (OUTPATIENT)
Dept: GENERAL RADIOLOGY | Facility: HOSPITAL | Age: 51
End: 2021-11-15

## 2021-11-15 VITALS
BODY MASS INDEX: 21.12 KG/M2 | TEMPERATURE: 98.3 F | OXYGEN SATURATION: 100 % | DIASTOLIC BLOOD PRESSURE: 87 MMHG | RESPIRATION RATE: 19 BRPM | HEART RATE: 87 BPM | WEIGHT: 126.76 LBS | SYSTOLIC BLOOD PRESSURE: 130 MMHG | HEIGHT: 65 IN

## 2021-11-15 DIAGNOSIS — S22.41XS CLOSED FRACTURE OF MULTIPLE RIBS OF RIGHT SIDE, SEQUELA: Primary | ICD-10-CM

## 2021-11-15 PROCEDURE — 96372 THER/PROPH/DIAG INJ SC/IM: CPT

## 2021-11-15 PROCEDURE — 99282 EMERGENCY DEPT VISIT SF MDM: CPT

## 2021-11-15 PROCEDURE — 25010000002 KETOROLAC TROMETHAMINE PER 15 MG

## 2021-11-15 PROCEDURE — 71101 X-RAY EXAM UNILAT RIBS/CHEST: CPT

## 2021-11-15 RX ORDER — TRAMADOL HYDROCHLORIDE 50 MG/1
50 TABLET ORAL EVERY 6 HOURS PRN
Qty: 12 TABLET | Refills: 0 | Status: SHIPPED | OUTPATIENT
Start: 2021-11-15 | End: 2022-03-31 | Stop reason: SDDI

## 2021-11-15 RX ORDER — KETOROLAC TROMETHAMINE 30 MG/ML
60 INJECTION, SOLUTION INTRAMUSCULAR; INTRAVENOUS ONCE
Status: COMPLETED | OUTPATIENT
Start: 2021-11-15 | End: 2021-11-15

## 2021-11-15 RX ORDER — KETOROLAC TROMETHAMINE 10 MG/1
10 TABLET, FILM COATED ORAL EVERY 6 HOURS PRN
Qty: 10 TABLET | Refills: 0 | Status: SHIPPED | OUTPATIENT
Start: 2021-11-15 | End: 2022-03-31 | Stop reason: SDDI

## 2021-11-15 RX ORDER — LIDOCAINE 50 MG/G
1 PATCH TOPICAL EVERY 24 HOURS
Qty: 5 PATCH | Refills: 0 | Status: SHIPPED | OUTPATIENT
Start: 2021-11-15 | End: 2021-11-22

## 2021-11-15 RX ADMIN — KETOROLAC TROMETHAMINE 60 MG: 60 INJECTION, SOLUTION INTRAMUSCULAR at 20:29

## 2021-11-16 ENCOUNTER — LAB (OUTPATIENT)
Dept: LAB | Facility: HOSPITAL | Age: 51
End: 2021-11-16

## 2021-11-16 DIAGNOSIS — M80.00XD PATHOLOGICAL FRACTURE DUE TO OSTEOPOROSIS WITH ROUTINE HEALING, UNSPECIFIED FRACTURE SITE, UNSPECIFIED OSTEOPOROSIS TYPE, SUBSEQUENT ENCOUNTER: ICD-10-CM

## 2021-11-16 DIAGNOSIS — S22.41XG CLOSED FRACTURE OF MULTIPLE RIBS OF RIGHT SIDE WITH DELAYED HEALING, SUBSEQUENT ENCOUNTER: ICD-10-CM

## 2021-11-16 LAB
25(OH)D3 SERPL-MCNC: 34.3 NG/ML (ref 30–100)
ALBUMIN SERPL-MCNC: 4.4 G/DL (ref 3.5–5.2)
ALBUMIN/GLOB SERPL: 1.6 G/DL
ALP SERPL-CCNC: 80 U/L (ref 39–117)
ALT SERPL W P-5'-P-CCNC: 22 U/L (ref 1–33)
ANION GAP SERPL CALCULATED.3IONS-SCNC: 6 MMOL/L (ref 5–15)
AST SERPL-CCNC: 24 U/L (ref 1–32)
BILIRUB SERPL-MCNC: 0.2 MG/DL (ref 0–1.2)
BUN SERPL-MCNC: 19 MG/DL (ref 6–20)
BUN/CREAT SERPL: 25 (ref 7–25)
CALCIUM SPEC-SCNC: 9.2 MG/DL (ref 8.6–10.5)
CHLORIDE SERPL-SCNC: 105 MMOL/L (ref 98–107)
CO2 SERPL-SCNC: 26 MMOL/L (ref 22–29)
CREAT SERPL-MCNC: 0.76 MG/DL (ref 0.57–1)
CRP SERPL-MCNC: <0.3 MG/DL (ref 0–0.5)
DEPRECATED RDW RBC AUTO: 42.8 FL (ref 37–54)
ERYTHROCYTE [DISTWIDTH] IN BLOOD BY AUTOMATED COUNT: 13.5 % (ref 12.3–15.4)
ERYTHROCYTE [SEDIMENTATION RATE] IN BLOOD: 10 MM/HR (ref 0–20)
GFR SERPL CREATININE-BSD FRML MDRD: 81 ML/MIN/1.73
GLOBULIN UR ELPH-MCNC: 2.8 GM/DL
GLUCOSE SERPL-MCNC: 120 MG/DL (ref 65–99)
HCT VFR BLD AUTO: 39.9 % (ref 34–46.6)
HGB BLD-MCNC: 12.9 G/DL (ref 12–15.9)
MCH RBC QN AUTO: 27.8 PG (ref 26.6–33)
MCHC RBC AUTO-ENTMCNC: 32.3 G/DL (ref 31.5–35.7)
MCV RBC AUTO: 86 FL (ref 79–97)
PLATELET # BLD AUTO: 270 10*3/MM3 (ref 140–450)
PMV BLD AUTO: 8.8 FL (ref 6–12)
POTASSIUM SERPL-SCNC: 5.3 MMOL/L (ref 3.5–5.2)
PROT SERPL-MCNC: 7.2 G/DL (ref 6–8.5)
RBC # BLD AUTO: 4.64 10*6/MM3 (ref 3.77–5.28)
SODIUM SERPL-SCNC: 137 MMOL/L (ref 136–145)
VIT B12 BLD-MCNC: 430 PG/ML (ref 211–946)
WBC # BLD AUTO: 6.99 10*3/MM3 (ref 3.4–10.8)

## 2021-11-16 PROCEDURE — 82306 VITAMIN D 25 HYDROXY: CPT

## 2021-11-16 PROCEDURE — 85652 RBC SED RATE AUTOMATED: CPT

## 2021-11-16 PROCEDURE — 86225 DNA ANTIBODY NATIVE: CPT

## 2021-11-16 PROCEDURE — 36415 COLL VENOUS BLD VENIPUNCTURE: CPT

## 2021-11-16 PROCEDURE — 85027 COMPLETE CBC AUTOMATED: CPT

## 2021-11-16 PROCEDURE — 80053 COMPREHEN METABOLIC PANEL: CPT

## 2021-11-16 PROCEDURE — 86140 C-REACTIVE PROTEIN: CPT

## 2021-11-16 PROCEDURE — 86038 ANTINUCLEAR ANTIBODIES: CPT

## 2021-11-16 PROCEDURE — 82607 VITAMIN B-12: CPT

## 2021-11-17 LAB
DSDNA IGG SERPL IA-ACNC: NEGATIVE [IU]/ML
NUCLEAR IGG SER IA-RTO: NEGATIVE

## 2021-11-18 ENCOUNTER — PRIOR AUTHORIZATION (OUTPATIENT)
Dept: FAMILY MEDICINE CLINIC | Age: 51
End: 2021-11-18

## 2021-11-18 DIAGNOSIS — R07.89 ANTERIOR CHEST WALL PAIN: ICD-10-CM

## 2021-11-22 RX ORDER — LIDOCAINE 50 MG/G
1 PATCH TOPICAL EVERY 24 HOURS
Qty: 30 EACH | Refills: 0 | Status: SHIPPED | OUTPATIENT
Start: 2021-11-22 | End: 2022-03-31

## 2021-12-03 ENCOUNTER — HOSPITAL ENCOUNTER (OUTPATIENT)
Dept: NUCLEAR MEDICINE | Facility: HOSPITAL | Age: 51
Discharge: HOME | End: 2021-12-03

## 2021-12-03 ENCOUNTER — TELEPHONE (OUTPATIENT)
Dept: FAMILY MEDICINE CLINIC | Age: 51
End: 2021-12-03

## 2021-12-03 DIAGNOSIS — S22.41XG CLOSED FRACTURE OF MULTIPLE RIBS OF RIGHT SIDE WITH DELAYED HEALING, SUBSEQUENT ENCOUNTER: ICD-10-CM

## 2021-12-03 PROCEDURE — 78306 BONE IMAGING WHOLE BODY: CPT

## 2021-12-03 PROCEDURE — A9503 TC99M MEDRONATE: HCPCS | Performed by: NURSE PRACTITIONER

## 2021-12-03 PROCEDURE — 0 TECHNETIUM MEDRONATE KIT: Performed by: NURSE PRACTITIONER

## 2021-12-03 RX ORDER — TC 99M MEDRONATE 20 MG/10ML
21.4 INJECTION, POWDER, LYOPHILIZED, FOR SOLUTION INTRAVENOUS
Status: COMPLETED | OUTPATIENT
Start: 2021-12-03 | End: 2021-12-03

## 2021-12-03 RX ADMIN — TC 99M MEDRONATE 21.4 MILLICURIE: 20 INJECTION, POWDER, LYOPHILIZED, FOR SOLUTION INTRAVENOUS at 09:41

## 2021-12-03 NOTE — TELEPHONE ENCOUNTER
See 11- pt message she is still requesting lab results from [facility] and also is still having problems with labs and also how to increase bone density

## 2021-12-06 ENCOUNTER — DOCUMENTATION (OUTPATIENT)
Dept: FAMILY MEDICINE CLINIC | Age: 51
End: 2021-12-06

## 2022-01-05 DIAGNOSIS — M25.50 ARTHRALGIA, UNSPECIFIED JOINT: ICD-10-CM

## 2022-01-05 DIAGNOSIS — G47.00 INSOMNIA, UNSPECIFIED TYPE: ICD-10-CM

## 2022-01-05 RX ORDER — CYCLOBENZAPRINE HCL 10 MG
TABLET ORAL
Qty: 20 TABLET | Refills: 0 | Status: SHIPPED | OUTPATIENT
Start: 2022-01-05 | End: 2022-04-19

## 2022-01-05 RX ORDER — DULOXETIN HYDROCHLORIDE 20 MG/1
CAPSULE, DELAYED RELEASE ORAL
Qty: 90 CAPSULE | Refills: 0 | Status: SHIPPED | OUTPATIENT
Start: 2022-01-05 | End: 2022-03-31 | Stop reason: SDDI

## 2022-01-05 RX ORDER — DICLOFENAC SODIUM 75 MG/1
TABLET, DELAYED RELEASE ORAL
Qty: 60 TABLET | Refills: 0 | Status: SHIPPED | OUTPATIENT
Start: 2022-01-05

## 2022-01-05 RX ORDER — HYDROXYZINE 50 MG/1
TABLET, FILM COATED ORAL
Qty: 30 TABLET | Refills: 0 | Status: SHIPPED | OUTPATIENT
Start: 2022-01-05

## 2022-03-25 ENCOUNTER — TRANSCRIBE ORDERS (OUTPATIENT)
Dept: ADMINISTRATIVE | Facility: HOSPITAL | Age: 52
End: 2022-03-25

## 2022-03-25 DIAGNOSIS — E21.3 HYPERPARATHYROIDISM, UNSPECIFIED: Primary | ICD-10-CM

## 2022-03-29 ENCOUNTER — HOSPITAL ENCOUNTER (OUTPATIENT)
Dept: ULTRASOUND IMAGING | Facility: HOSPITAL | Age: 52
Discharge: HOME | End: 2022-03-29
Admitting: INTERNAL MEDICINE

## 2022-03-29 DIAGNOSIS — E21.3 HYPERPARATHYROIDISM, UNSPECIFIED: ICD-10-CM

## 2022-03-29 PROCEDURE — 76536 US EXAM OF HEAD AND NECK: CPT

## 2022-03-31 ENCOUNTER — APPOINTMENT (OUTPATIENT)
Dept: ULTRASOUND IMAGING | Facility: HOSPITAL | Age: 52
End: 2022-03-31

## 2022-03-31 ENCOUNTER — LAB (OUTPATIENT)
Dept: LAB | Facility: HOSPITAL | Age: 52
End: 2022-03-31

## 2022-03-31 ENCOUNTER — OFFICE VISIT (OUTPATIENT)
Dept: FAMILY MEDICINE CLINIC | Age: 52
End: 2022-03-31

## 2022-03-31 VITALS
DIASTOLIC BLOOD PRESSURE: 78 MMHG | TEMPERATURE: 98.4 F | HEART RATE: 75 BPM | WEIGHT: 131 LBS | SYSTOLIC BLOOD PRESSURE: 125 MMHG | HEIGHT: 65 IN | OXYGEN SATURATION: 99 % | BODY MASS INDEX: 21.83 KG/M2

## 2022-03-31 DIAGNOSIS — M80.00XD PATHOLOGICAL FRACTURE DUE TO OSTEOPOROSIS WITH ROUTINE HEALING, UNSPECIFIED FRACTURE SITE, UNSPECIFIED OSTEOPOROSIS TYPE, SUBSEQUENT ENCOUNTER: Primary | ICD-10-CM

## 2022-03-31 DIAGNOSIS — M80.00XD PATHOLOGICAL FRACTURE DUE TO OSTEOPOROSIS WITH ROUTINE HEALING, UNSPECIFIED FRACTURE SITE, UNSPECIFIED OSTEOPOROSIS TYPE, SUBSEQUENT ENCOUNTER: ICD-10-CM

## 2022-03-31 PROCEDURE — 82330 ASSAY OF CALCIUM: CPT

## 2022-03-31 PROCEDURE — 83970 ASSAY OF PARATHORMONE: CPT

## 2022-03-31 PROCEDURE — 36415 COLL VENOUS BLD VENIPUNCTURE: CPT

## 2022-03-31 PROCEDURE — 82306 VITAMIN D 25 HYDROXY: CPT

## 2022-03-31 PROCEDURE — 99212 OFFICE O/P EST SF 10 MIN: CPT | Performed by: NURSE PRACTITIONER

## 2022-03-31 PROCEDURE — 82310 ASSAY OF CALCIUM: CPT

## 2022-03-31 RX ORDER — DEXTROAMPHETAMINE SACCHARATE, AMPHETAMINE ASPARTATE, DEXTROAMPHETAMINE SULFATE AND AMPHETAMINE SULFATE 2.5; 2.5; 2.5; 2.5 MG/1; MG/1; MG/1; MG/1
1 TABLET ORAL 2 TIMES DAILY
COMMUNITY
Start: 2022-03-07

## 2022-03-31 RX ORDER — PROGESTERONE 200 MG/1
CAPSULE ORAL
COMMUNITY
Start: 2022-03-26 | End: 2022-04-08

## 2022-03-31 RX ORDER — ALENDRONATE SODIUM 70 MG/1
70 TABLET ORAL WEEKLY
COMMUNITY
Start: 2022-03-25 | End: 2023-03-13 | Stop reason: SDUPTHER

## 2022-04-01 LAB
25(OH)D3 SERPL-MCNC: 49.2 NG/ML (ref 30–100)
CA-I BLD-MCNC: 5.4 MG/DL (ref 4.6–5.4)
CA-I SERPL ISE-MCNC: 1.36 MMOL/L (ref 1.15–1.35)
CALCIUM SPEC-SCNC: 9.7 MG/DL (ref 8.6–10.5)
PTH-INTACT SERPL-MCNC: 44.5 PG/ML (ref 15–65)

## 2022-04-05 ENCOUNTER — APPOINTMENT (OUTPATIENT)
Dept: BONE DENSITY | Facility: HOSPITAL | Age: 52
End: 2022-04-05

## 2022-04-07 ENCOUNTER — HOSPITAL ENCOUNTER (OUTPATIENT)
Dept: BONE DENSITY | Facility: HOSPITAL | Age: 52
Discharge: HOME | End: 2022-04-07
Admitting: NURSE PRACTITIONER

## 2022-04-07 PROCEDURE — 77080 DXA BONE DENSITY AXIAL: CPT

## 2022-04-08 ENCOUNTER — APPOINTMENT (OUTPATIENT)
Dept: BONE DENSITY | Facility: HOSPITAL | Age: 52
End: 2022-04-08

## 2022-04-08 PROBLEM — R63.4 ABNORMAL WEIGHT LOSS: Status: ACTIVE | Noted: 2021-12-31

## 2022-04-08 PROBLEM — M81.0 POSTMENOPAUSAL OSTEOPOROSIS: Status: ACTIVE | Noted: 2021-12-31

## 2022-04-08 PROBLEM — R53.82 CHRONIC FATIGUE: Status: ACTIVE | Noted: 2021-12-31

## 2022-04-08 NOTE — PROGRESS NOTES
"Chief Complaint  Imaging Only (Discuss ordering dexa, labs )    Subjective    Patient is a 52 year old female in today needing a bone density scan and labs drawn. Patient has been seeing an endocrinologist due to reoccurring rib fractures without trama and unintentional weight loss. The doctor is wanting her to have repeat lab draws from a different facility for confirmation of results.     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 Associated symptoms include fatigue. Pertinent negatives include no fever. She has tried eating for the symptoms. The treatment provided no relief.       Objective   Vital Signs:   /78 (BP Location: Left arm, Patient Position: Sitting)   Pulse 75   Temp 98.4 °F (36.9 °C)   Ht 165.1 cm (65\")   Wt 59.4 kg (131 lb)   SpO2 99%   BMI 21.80 kg/m²     BMI is within normal parameters. No follow-up required.      Physical Exam  HENT:      Head: Normocephalic.   Cardiovascular:      Rate and Rhythm: Normal rate and regular rhythm.   Pulmonary:      Effort: Pulmonary effort is normal.      Breath sounds: Normal breath sounds.   Skin:     General: Skin is warm and dry.   Neurological:      Mental Status: She is alert and oriented to person, place, and time.   Psychiatric:         Mood and Affect: Mood normal.        Result Review :                 Assessment and Plan    Diagnoses and all orders for this visit:    1. Pathological fracture due to osteoporosis with routine healing, unspecified fracture site, unspecified osteoporosis type, subsequent encounter (Primary)  -     PTH, Intact; Future  -     Calcium, Ionized; " Future  -     Calcium; Future  -     Vitamin D 25 hydroxy; Future  -     DEXA Bone Density Axial        Follow Up   Return if symptoms worsen or fail to improve.  Patient was given instructions and counseling regarding her condition or for health maintenance advice. Please see specific information pulled into the AVS if appropriate.

## 2022-04-19 RX ORDER — TOPIRAMATE 25 MG/1
TABLET ORAL
Qty: 90 TABLET | Refills: 0 | Status: SHIPPED | OUTPATIENT
Start: 2022-04-19 | End: 2022-05-24

## 2022-04-19 RX ORDER — CYCLOBENZAPRINE HCL 10 MG
TABLET ORAL
Qty: 20 TABLET | Refills: 0 | Status: SHIPPED | OUTPATIENT
Start: 2022-04-19 | End: 2022-12-07

## 2022-05-24 ENCOUNTER — HOSPITAL ENCOUNTER (OUTPATIENT)
Dept: GENERAL RADIOLOGY | Facility: HOSPITAL | Age: 52
Discharge: HOME | End: 2022-05-24

## 2022-05-24 ENCOUNTER — OFFICE VISIT (OUTPATIENT)
Dept: FAMILY MEDICINE CLINIC | Age: 52
End: 2022-05-24

## 2022-05-24 VITALS
HEIGHT: 65 IN | TEMPERATURE: 98.3 F | OXYGEN SATURATION: 99 % | BODY MASS INDEX: 21.49 KG/M2 | DIASTOLIC BLOOD PRESSURE: 88 MMHG | HEART RATE: 85 BPM | SYSTOLIC BLOOD PRESSURE: 127 MMHG | WEIGHT: 129 LBS

## 2022-05-24 DIAGNOSIS — M25.572 ACUTE LEFT ANKLE PAIN: Primary | ICD-10-CM

## 2022-05-24 DIAGNOSIS — M79.605 LEFT LEG PAIN: ICD-10-CM

## 2022-05-24 DIAGNOSIS — Z23 NEED FOR VACCINATION: ICD-10-CM

## 2022-05-24 DIAGNOSIS — M25.572 ACUTE LEFT ANKLE PAIN: ICD-10-CM

## 2022-05-24 PROCEDURE — 0054A COVID-19 (PFIZER) 12+ YRS: CPT | Performed by: PHYSICIAN ASSISTANT

## 2022-05-24 PROCEDURE — 99213 OFFICE O/P EST LOW 20 MIN: CPT | Performed by: PHYSICIAN ASSISTANT

## 2022-05-24 PROCEDURE — 91305 COVID-19 (PFIZER) 12+ YRS: CPT | Performed by: PHYSICIAN ASSISTANT

## 2022-05-24 PROCEDURE — 73610 X-RAY EXAM OF ANKLE: CPT

## 2022-05-24 PROCEDURE — 73590 X-RAY EXAM OF LOWER LEG: CPT

## 2022-05-24 NOTE — PATIENT INSTRUCTIONS
Your x-rays today do not show any fractures! Sprains typically heal within 1 to 2 weeks. You can take tylenol and/or ibuprofen as directed to help your pain. I also recommend icing the affected area 2-3 times a day for the first 2-3 days after your injury. If this is not what you are noticing, or if your pain persist greater than 2 weeks please follow-up with your primary care provider for further evaluation.

## 2022-05-24 NOTE — PROGRESS NOTES
Subjective     CHIEF COMPLAINT    Chief Complaint   Patient presents with   • Ankle Pain     (L). Ongoing for a couple days.            This is a 52 year old female s/p partial parathyroidectomy 5 weeks ago presenting to the clinic complaining of left ankle pain and swelling since last night. She denies any known injury but has had multiple atraumatic bone fractures secondary to her hyperparathyroidism.     She would also like to get her COVID booster shot today.              Review of Systems   Constitutional: Negative for chills and fever.   Musculoskeletal: Positive for arthralgias, gait problem and joint swelling. Negative for myalgias.   Skin: Negative for wound.   Neurological: Negative for weakness and numbness.            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, unspecified             Past Surgical History:   Procedure Laterality Date   • LAPAROSCOPIC CHOLECYSTECTOMY     • LASER ABLATION      UTERINE   • TUBAL ABDOMINAL LIGATION Bilateral             Family History   Problem Relation Age of Onset   • Cancer Mother         Uterine/Bladder   • Colon cancer Father    • Heart attack Maternal Grandmother    • Other Maternal Grandmother         Brain Tumor   • Breast cancer Maternal Grandmother             Social History     Socioeconomic History   • Marital status: Single   • Number of children: 1   Tobacco Use   • Smoking status: Former Smoker     Packs/day: 1.00     Years: 10.00     Pack years: 10.00     Types: Cigarettes     Quit date: 2015     Years since quittin.3   • Smokeless tobacco: Never Used   Substance and Sexual Activity   • Alcohol use: Yes     Comment: Rarely   • Drug use: Never            Allergies   Allergen Reactions   • Codeine Unknown - High Severity   • Morphine Itching   •  "Sulfa Antibiotics Unknown - High Severity and Other (See Comments)   • Sumatriptan Unknown - High Severity            Current Outpatient Medications on File Prior to Visit   Medication Sig Dispense Refill   • alendronate (FOSAMAX) 70 MG tablet Take 70 mg by mouth 1 (One) Time Per Week.     • amphetamine-dextroamphetamine (ADDERALL) 10 MG tablet Take 1 tablet by mouth 2 (Two) Times a Day.     • Calcium Carbonate-Vitamin D (calcium-vitamin D) 500-200 MG-UNIT tablet per tablet Take 1 tablet by mouth Daily.     • cyclobenzaprine (FLEXERIL) 10 MG tablet TAKE 1/2 (ONE-HALF) TO 1 TABLET BY MOUTH AT NIGHT AS NEEDED 20 tablet 0   • diclofenac (VOLTAREN) 75 MG EC tablet Take 1 tablet by mouth twice daily as needed 60 tablet 0   • estradiol-levonorgestrel (CLIMARAPRO) 0.045-0.015 MG/DAY Place 1 patch on the skin as directed by provider 1 (One) Time Per Week.     • hydrOXYzine (ATARAX) 50 MG tablet TAKE 1 TABLET BY MOUTH AT BEDTIME AS NEEDED FOR INSOMNIA 30 tablet 0   • Linzess 72 MCG capsule capsule TAKE 1 CAPSULE BY MOUTH IN THE MORNING BEFORE BREAKFAST 30 capsule 11   • Probiotic Product capsule Take  by mouth.     • [DISCONTINUED] buPROPion XL (WELLBUTRIN XL) 150 MG 24 hr tablet TAKE 1 TO 2 TABLETS BY MOUTH ONCE DAILY 180 tablet 0   • [DISCONTINUED] topiramate (TOPAMAX) 25 MG tablet TAKE 1 TABLET BY MOUTH ONCE DAILY AT BEDTIME 90 tablet 0     No current facility-administered medications on file prior to visit.            /88 (BP Location: Right arm, Patient Position: Sitting, Cuff Size: Adult)   Pulse 85   Temp 98.3 °F (36.8 °C) (Oral)   Ht 165.1 cm (65\")   Wt 58.5 kg (129 lb)   SpO2 99% Comment: room air  BMI 21.47 kg/m²          Objective     Physical Exam  Vitals and nursing note reviewed.   Constitutional:       General: She is not in acute distress.     Appearance: Normal appearance.   Pulmonary:      Effort: Pulmonary effort is normal. No respiratory distress.   Musculoskeletal:      Left ankle: Swelling " (lateral malleolus) present. No deformity or ecchymosis. Tenderness present over the lateral malleolus (and distal fibula). No base of 5th metatarsal or proximal fibula tenderness. Normal range of motion. Normal pulse.      Left foot: Normal. No swelling or tenderness. Normal pulse.   Skin:     General: Skin is warm and dry.      Findings: No bruising or erythema.   Neurological:      Mental Status: She is alert and oriented to person, place, and time.   Psychiatric:         Mood and Affect: Mood normal.         Behavior: Behavior normal.              Procedures                    Lab Results (last 24 hours)     ** No results found for the last 24 hours. **                XR Tibia Fibula 2 View Left    Result Date: 5/24/2022  PROCEDURE: XR TIBIA FIBULA 2 VW LEFT  COMPARISON: None  INDICATIONS: DISTAL LEFT LATERAL LOWER LEG SWELLING X 1 DAY, NO KNOWN INJURY  FINDINGS:  Lateral soft tissue swelling at the ankle.  No fracture or dislocation.  Normal appearance of the tibia and fibula.       Lateral soft tissue swelling at the ankle.  Otherwise negative radiographs of the left tibia/fibula.  No fracture or dislocation.      CHERRY GAY MD       Electronically Signed and Approved By: CHERRY GAY MD on 5/24/2022 at 15:20             XR Ankle 3+ View Left    Result Date: 5/24/2022  PROCEDURE: XR ANKLE 3+ VW LEFT  COMPARISON: [facility] , XR TIBIA FIBULA 2 VW LEFT, 5/24/2022, 15:06.  INDICATIONS: LEFT LATERAL ANKLE SWELLING X 1 DAY, NO KNOWN INJURY  FINDINGS:  There is lateral soft tissue swelling.  No fracture demonstrated throughout and no dislocation.  Normal joint spacing       Soft tissue injury and swelling laterally but no fracture or dislocation of the left ankle.      CHERRY GAY MD       Electronically Signed and Approved By: CHERRY GAY MD on 5/24/2022 at 15:17                               Diagnoses and all orders for this visit:    1. Acute left ankle pain (Primary)  -     XR Ankle 3+ View  Left; Future  -     Cancel: XR Tibia Fibula 2 View Left (In Office)    2. Left leg pain  -     XR Tibia Fibula 2 View Left; Future    3. Need for vaccination  -     COVID-19 Vaccine (Pfizer) Gray Cap                           FOR FULL DISCHARGE INSTRUCTIONS/COMMENTS/HANDOUTS please see the AVS

## 2022-05-31 ENCOUNTER — TELEPHONE (OUTPATIENT)
Dept: FAMILY MEDICINE CLINIC | Age: 52
End: 2022-05-31

## 2022-06-01 NOTE — TELEPHONE ENCOUNTER
I did not see her for this condition - She will need to follow up in the office for further recommendations or additional testing

## 2022-06-02 ENCOUNTER — OFFICE VISIT (OUTPATIENT)
Dept: FAMILY MEDICINE CLINIC | Age: 52
End: 2022-06-02

## 2022-06-02 VITALS
BODY MASS INDEX: 21.63 KG/M2 | HEIGHT: 65 IN | WEIGHT: 129.8 LBS | HEART RATE: 84 BPM | TEMPERATURE: 98.7 F | DIASTOLIC BLOOD PRESSURE: 71 MMHG | SYSTOLIC BLOOD PRESSURE: 128 MMHG | OXYGEN SATURATION: 100 %

## 2022-06-02 DIAGNOSIS — M25.472 LEFT ANKLE SWELLING: ICD-10-CM

## 2022-06-02 DIAGNOSIS — M25.572 ACUTE LEFT ANKLE PAIN: Primary | ICD-10-CM

## 2022-06-02 PROCEDURE — 99213 OFFICE O/P EST LOW 20 MIN: CPT | Performed by: PHYSICIAN ASSISTANT

## 2022-06-02 NOTE — PROGRESS NOTES
"Subjective     CHIEF COMPLAINT    Chief Complaint   Patient presents with   • Ankle Pain     Left, still having swelling             This is a 52-year-old female presenting to clinic complaining of left ankle pain.  This has been ongoing since 5/22/2022 and she has had no injury.  She did have negative foot and ankle x-rays on 5/23/2022 but is concerned because her symptoms have not yet improved.  She actually feels like the swelling has been getting worse and she states she can feel a \"knot\" in the back of her lower calf.  Denies any erythema or wounds over the area.  No fevers or chills.  She does take hormone replacement therapy and is concerned about a possible blood clot secondary to this.  She also is requesting an MRI because she is concerned that she might have a ligamentous injury.            Review of Systems   Constitutional: Negative for chills and fever.   Respiratory: Negative for shortness of breath.    Cardiovascular: Negative for chest pain.   Musculoskeletal: Positive for joint swelling. Negative for arthralgias, gait problem and myalgias.   Skin: Negative for rash and wound.   Neurological: Negative for weakness and numbness.            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, unspecified             Past Surgical History:   Procedure Laterality Date   • LAPAROSCOPIC CHOLECYSTECTOMY     • LASER ABLATION      UTERINE   • TUBAL ABDOMINAL LIGATION Bilateral             Family History   Problem Relation Age of Onset   • Cancer Mother         Uterine/Bladder   • Colon cancer Father    • Heart attack Maternal Grandmother    • Other Maternal Grandmother         Brain Tumor   • Breast cancer Maternal Grandmother             Social History     Socioeconomic History   • Marital status: Single   • " "Number of children: 1   Tobacco Use   • Smoking status: Former Smoker     Packs/day: 1.00     Years: 10.00     Pack years: 10.00     Types: Cigarettes     Quit date: 2015     Years since quittin.3   • Smokeless tobacco: Never Used   Substance and Sexual Activity   • Alcohol use: Yes     Comment: Rarely   • Drug use: Never            Allergies   Allergen Reactions   • Codeine Unknown - High Severity   • Morphine Itching   • Sulfa Antibiotics Unknown - High Severity and Other (See Comments)   • Sumatriptan Unknown - High Severity            Current Outpatient Medications on File Prior to Visit   Medication Sig Dispense Refill   • alendronate (FOSAMAX) 70 MG tablet Take 70 mg by mouth 1 (One) Time Per Week.     • amphetamine-dextroamphetamine (ADDERALL) 10 MG tablet Take 1 tablet by mouth 2 (Two) Times a Day.     • Calcium Carbonate-Vitamin D (calcium-vitamin D) 500-200 MG-UNIT tablet per tablet Take 1 tablet by mouth Daily.     • cyclobenzaprine (FLEXERIL) 10 MG tablet TAKE 1/2 (ONE-HALF) TO 1 TABLET BY MOUTH AT NIGHT AS NEEDED 20 tablet 0   • diclofenac (VOLTAREN) 75 MG EC tablet Take 1 tablet by mouth twice daily as needed 60 tablet 0   • estradiol-levonorgestrel (CLIMARAPRO) 0.045-0.015 MG/DAY Place 1 patch on the skin as directed by provider 1 (One) Time Per Week.     • hydrOXYzine (ATARAX) 50 MG tablet TAKE 1 TABLET BY MOUTH AT BEDTIME AS NEEDED FOR INSOMNIA 30 tablet 0   • Linzess 72 MCG capsule capsule TAKE 1 CAPSULE BY MOUTH IN THE MORNING BEFORE BREAKFAST 30 capsule 11   • Probiotic Product capsule Take  by mouth.     • Teriparatide, Recombinant, (FORTEO) 600 MCG/2.4ML injection Inject 20 mcg under the skin into the appropriate area as directed Daily.       No current facility-administered medications on file prior to visit.            /71 (BP Location: Left arm, Patient Position: Sitting)   Pulse 84   Temp 98.7 °F (37.1 °C) (Oral)   Ht 165.1 cm (65\")   Wt 58.9 kg (129 lb 12.8 oz)   SpO2 " 100% Comment: on room air  BMI 21.60 kg/m²          Objective     Physical Exam  Vitals and nursing note reviewed.   Constitutional:       General: She is not in acute distress.     Appearance: Normal appearance.   Pulmonary:      Effort: Pulmonary effort is normal. No respiratory distress.   Musculoskeletal:      Left ankle: Swelling present. No deformity or ecchymosis. Tenderness present over the lateral malleolus (Very mild TTP). Normal range of motion. Normal pulse.      Left Achilles Tendon: No tenderness or defects.   Skin:     General: Skin is warm and dry.      Findings: No bruising or erythema.   Neurological:      Mental Status: She is alert and oriented to person, place, and time.   Psychiatric:         Mood and Affect: Mood normal.         Behavior: Behavior normal.              Procedures                    Lab Results (last 24 hours)     ** No results found for the last 24 hours. **                No Radiology Exams Resulted Within Past 24 Hours                     Diagnoses and all orders for this visit:    1. Acute left ankle pain (Primary)  -     Ambulatory Referral to Physical Therapy Evaluate and treat  -     Ambulatory Referral to Orthopedic Surgery    2. Left ankle swelling  Comments:  only risk factor is hormone replacement therapy, but patient is very concerned about DVT.   Orders:  -     Duplex Venous Lower Extremity - Left CAR; Future             Additional Instructions for the Follow-ups that You Need to Schedule     Ambulatory Referral to Physical Therapy Evaluate and treat   As directed      Specialty needed: Evaluate and treat                         FOR FULL DISCHARGE INSTRUCTIONS/COMMENTS/HANDOUTS please see the AVS

## 2022-06-15 DIAGNOSIS — G47.00 INSOMNIA, UNSPECIFIED TYPE: ICD-10-CM

## 2022-06-16 RX ORDER — HYDROXYZINE 50 MG/1
TABLET, FILM COATED ORAL
Qty: 30 TABLET | Refills: 0 | OUTPATIENT
Start: 2022-06-16

## 2022-06-17 ENCOUNTER — TELEPHONE (OUTPATIENT)
Dept: FAMILY MEDICINE CLINIC | Age: 52
End: 2022-06-17

## 2022-06-28 ENCOUNTER — TELEPHONE (OUTPATIENT)
Dept: FAMILY MEDICINE CLINIC | Age: 52
End: 2022-06-28

## 2022-12-07 RX ORDER — CYCLOBENZAPRINE HCL 10 MG
TABLET ORAL
Qty: 20 TABLET | Refills: 0 | Status: SHIPPED | OUTPATIENT
Start: 2022-12-07 | End: 2023-03-13 | Stop reason: SDUPTHER

## 2023-03-13 ENCOUNTER — OFFICE VISIT (OUTPATIENT)
Dept: FAMILY MEDICINE CLINIC | Age: 53
End: 2023-03-13
Payer: MEDICAID

## 2023-03-13 ENCOUNTER — LAB (OUTPATIENT)
Dept: LAB | Facility: HOSPITAL | Age: 53
End: 2023-03-13
Payer: MEDICAID

## 2023-03-13 VITALS
TEMPERATURE: 98.3 F | BODY MASS INDEX: 20.33 KG/M2 | HEART RATE: 74 BPM | DIASTOLIC BLOOD PRESSURE: 71 MMHG | OXYGEN SATURATION: 98 % | WEIGHT: 122 LBS | HEIGHT: 65 IN | SYSTOLIC BLOOD PRESSURE: 109 MMHG

## 2023-03-13 DIAGNOSIS — Z11.59 SCREENING FOR VIRAL DISEASE: ICD-10-CM

## 2023-03-13 DIAGNOSIS — R19.7 DIARRHEA, UNSPECIFIED TYPE: ICD-10-CM

## 2023-03-13 DIAGNOSIS — M19.90 ARTHRITIS: ICD-10-CM

## 2023-03-13 DIAGNOSIS — S22.41XG CLOSED FRACTURE OF MULTIPLE RIBS OF RIGHT SIDE WITH DELAYED HEALING, SUBSEQUENT ENCOUNTER: ICD-10-CM

## 2023-03-13 DIAGNOSIS — R53.82 CHRONIC FATIGUE: ICD-10-CM

## 2023-03-13 DIAGNOSIS — Z12.31 ENCOUNTER FOR SCREENING MAMMOGRAM FOR MALIGNANT NEOPLASM OF BREAST: ICD-10-CM

## 2023-03-13 DIAGNOSIS — G43.809 OTHER MIGRAINE WITHOUT STATUS MIGRAINOSUS, NOT INTRACTABLE: ICD-10-CM

## 2023-03-13 DIAGNOSIS — Z13.6 SCREENING FOR CARDIOVASCULAR CONDITION: ICD-10-CM

## 2023-03-13 DIAGNOSIS — M81.0 OSTEOPOROSIS OF LUMBAR SPINE: ICD-10-CM

## 2023-03-13 DIAGNOSIS — R63.4 ABNORMAL WEIGHT LOSS: ICD-10-CM

## 2023-03-13 DIAGNOSIS — Z00.00 ROUTINE GENERAL MEDICAL EXAMINATION AT A HEALTH CARE FACILITY: Primary | ICD-10-CM

## 2023-03-13 PROBLEM — D35.1 PARATHYROID ADENOMA: Status: ACTIVE | Noted: 2022-05-23

## 2023-03-13 PROBLEM — G47.00 INSOMNIA: Status: ACTIVE | Noted: 2023-03-13

## 2023-03-13 PROBLEM — E21.3 HYPERPARATHYROIDISM (HCC): Status: ACTIVE | Noted: 2022-05-29

## 2023-03-13 PROBLEM — F90.0 ATTENTION DEFICIT HYPERACTIVITY DISORDER (ADHD), PREDOMINANTLY INATTENTIVE TYPE: Status: ACTIVE | Noted: 2023-03-13

## 2023-03-13 LAB
BACTERIA UR QL AUTO: ABNORMAL /HPF
BASOPHILS # BLD AUTO: 0.04 10*3/MM3 (ref 0–0.2)
BASOPHILS NFR BLD AUTO: 0.5 % (ref 0–1.5)
BILIRUB UR QL STRIP: NEGATIVE
CLARITY UR: CLEAR
COLOR UR: YELLOW
DEPRECATED RDW RBC AUTO: 41.6 FL (ref 37–54)
EOSINOPHIL # BLD AUTO: 0.22 10*3/MM3 (ref 0–0.4)
EOSINOPHIL NFR BLD AUTO: 2.7 % (ref 0.3–6.2)
ERYTHROCYTE [DISTWIDTH] IN BLOOD BY AUTOMATED COUNT: 13.2 % (ref 12.3–15.4)
GLUCOSE UR STRIP-MCNC: NEGATIVE MG/DL
HCT VFR BLD AUTO: 40.1 % (ref 34–46.6)
HGB BLD-MCNC: 13 G/DL (ref 12–15.9)
HGB UR QL STRIP.AUTO: ABNORMAL
IMM GRANULOCYTES # BLD AUTO: 0.02 10*3/MM3 (ref 0–0.05)
IMM GRANULOCYTES NFR BLD AUTO: 0.2 % (ref 0–0.5)
KETONES UR QL STRIP: ABNORMAL
LEUKOCYTE ESTERASE UR QL STRIP.AUTO: NEGATIVE
LYMPHOCYTES # BLD AUTO: 2.72 10*3/MM3 (ref 0.7–3.1)
LYMPHOCYTES NFR BLD AUTO: 34 % (ref 19.6–45.3)
MCH RBC QN AUTO: 27.9 PG (ref 26.6–33)
MCHC RBC AUTO-ENTMCNC: 32.4 G/DL (ref 31.5–35.7)
MCV RBC AUTO: 86.1 FL (ref 79–97)
MONOCYTES # BLD AUTO: 0.4 10*3/MM3 (ref 0.1–0.9)
MONOCYTES NFR BLD AUTO: 5 % (ref 5–12)
NEUTROPHILS NFR BLD AUTO: 4.61 10*3/MM3 (ref 1.7–7)
NEUTROPHILS NFR BLD AUTO: 57.6 % (ref 42.7–76)
NITRITE UR QL STRIP: NEGATIVE
PH UR STRIP.AUTO: 6.5 [PH] (ref 5–8)
PLATELET # BLD AUTO: 292 10*3/MM3 (ref 140–450)
PMV BLD AUTO: 8.7 FL (ref 6–12)
PROT UR QL STRIP: NEGATIVE
RBC # BLD AUTO: 4.66 10*6/MM3 (ref 3.77–5.28)
RBC # UR STRIP: ABNORMAL /HPF
REF LAB TEST METHOD: ABNORMAL
SP GR UR STRIP: 1.02 (ref 1–1.03)
SQUAMOUS #/AREA URNS HPF: ABNORMAL /HPF
UROBILINOGEN UR QL STRIP: ABNORMAL
WBC # UR STRIP: ABNORMAL /HPF
WBC NRBC COR # BLD: 8.01 10*3/MM3 (ref 3.4–10.8)

## 2023-03-13 PROCEDURE — 82306 VITAMIN D 25 HYDROXY: CPT

## 2023-03-13 PROCEDURE — 84443 ASSAY THYROID STIM HORMONE: CPT

## 2023-03-13 PROCEDURE — 86140 C-REACTIVE PROTEIN: CPT

## 2023-03-13 PROCEDURE — 80053 COMPREHEN METABOLIC PANEL: CPT

## 2023-03-13 PROCEDURE — 86038 ANTINUCLEAR ANTIBODIES: CPT

## 2023-03-13 PROCEDURE — 82607 VITAMIN B-12: CPT

## 2023-03-13 PROCEDURE — 81001 URINALYSIS AUTO W/SCOPE: CPT

## 2023-03-13 PROCEDURE — 85025 COMPLETE CBC W/AUTO DIFF WBC: CPT

## 2023-03-13 PROCEDURE — 87086 URINE CULTURE/COLONY COUNT: CPT

## 2023-03-13 PROCEDURE — 36415 COLL VENOUS BLD VENIPUNCTURE: CPT

## 2023-03-13 PROCEDURE — 85652 RBC SED RATE AUTOMATED: CPT

## 2023-03-13 PROCEDURE — 1159F MED LIST DOCD IN RCRD: CPT | Performed by: NURSE PRACTITIONER

## 2023-03-13 PROCEDURE — 86431 RHEUMATOID FACTOR QUANT: CPT

## 2023-03-13 PROCEDURE — 86803 HEPATITIS C AB TEST: CPT

## 2023-03-13 PROCEDURE — 99396 PREV VISIT EST AGE 40-64: CPT | Performed by: NURSE PRACTITIONER

## 2023-03-13 RX ORDER — MIRTAZAPINE 15 MG/1
1 TABLET, FILM COATED ORAL
COMMUNITY
Start: 2023-01-16

## 2023-03-13 RX ORDER — DIAZEPAM 5 MG/1
1 TABLET ORAL NIGHTLY PRN
COMMUNITY
Start: 2023-01-16

## 2023-03-13 RX ORDER — TOPIRAMATE 25 MG/1
25 TABLET ORAL 2 TIMES DAILY
Qty: 90 TABLET | Refills: 0 | Status: SHIPPED | OUTPATIENT
Start: 2023-03-13

## 2023-03-13 RX ORDER — ALENDRONATE SODIUM 70 MG/1
70 TABLET ORAL WEEKLY
Qty: 12 TABLET | Refills: 3 | Status: SHIPPED | OUTPATIENT
Start: 2023-03-13

## 2023-03-13 RX ORDER — HYDROXYZINE 50 MG/1
TABLET, FILM COATED ORAL
COMMUNITY
End: 2023-03-13 | Stop reason: SDUPTHER

## 2023-03-13 RX ORDER — CYCLOBENZAPRINE HCL 10 MG
5-10 TABLET ORAL NIGHTLY PRN
Qty: 20 TABLET | Refills: 0 | Status: SHIPPED | OUTPATIENT
Start: 2023-03-13

## 2023-03-13 NOTE — PROGRESS NOTES
Chief Complaint  Josefina Nunez presents to [facility] for Annual Exam (Pt states that she's feeling fatigued, & muscle aches. ) and mammogram (Pt due for mammogram, wanting to have done at [facility] )      Subjective     History of Present Illness  Josefina is here today for annual exam.   Last annual exam was several years ago  Last menstrual period:  S/p ablasion.  Diet / exercise:   Well balanced diet and exercise regularly  Patient's Body mass index is 20.3 kg/m². indicating that she is of normal weight (BMI 20-24.9).  Continues to lose weight despite no change in appetite     Patient Care Team:  Amara Garcia APRN as PCP - General (Nurse Practitioner)  Trae Levin MD as Consulting Physician (Endocrinology)  Lyn Lopez APRN (Nurse Practitioner)  Melvi Fitzgerald MD as Consulting Physician (Obstetrics and Gynecology)    Fatigue: Patient complains of fatigue. Symptoms began several years ago. Sentinal symptom the patient feels fatigue began with:unknown- thought that once she had her parathyroid partially removed- she would see improvement,but still with no significant improvement. Symptoms of her fatigue have been diffuse soft tissue aches and pains, fatigue with paradoxical insomnia, feelings of depression, general malaise and intermittent intestinal cramping and loose stools. Patient describes the following psychologic symptoms: depression and stress related to her recent health concerns.  Patient denies unusual rashes and GI blood loss. Symptoms have gradually worsened. Severity has been struggles to carry out day to day responsibilities.. Previous visits for this problem: yes, last seen several months ago by Dr. Pandya with minimal work up.     Josefina is currently under the care of endocrinology for osteoporosis which is currently treated with Fosamax due to inability to get Forteo approved by insurance .  She states that she continues to have bone  pain/ joint pain.  She states that her fatigue is severe.  She is also under the care of Psychiatry for management of her ADHD/ depression/anxiety. She takes Valium PRN, Adderall but not daily and not 2 times per day every day. She is on remeron to help her sleep.  She does not take the hydroxyzine often .      She is also on hormone replacement patch of which is prescribed currently by [facility]one, but will soon be picked up by new GYN Melvi post.        Scheduled for dexa / thyroid US scheduled for next week through            Assessment and Plan       Diagnoses and all orders for this visit:    1. Routine general medical examination at a health care facility (Primary)  Comments:  recommend  monitor weight - needs to currently maintain current weight.  annual wellness exam,health maintenance recommendations discussed     2. Chronic fatigue  Comments:  will repeat labs  consider sleep study, consider psychological / depression /stress - consider medication as cause  Orders:  -     CBC & Differential; Future  -     Urinalysis With Culture If Indicated -; Future  -     TSH Rfx On Abnormal To Free T4; Future  -     Vitamin B12; Future  -     Sedimentation rate, automated; Future  -     C-reactive protein; Future    3. Abnormal weight loss  Comments:  suspect may be due to Adderall- consider lower dose/ reduction     4. Diarrhea, unspecified type  Comments:  may be due to the adderall as discussed, will have her bring stool studies if persists  Orders:  -     Clostridioides difficile Toxin, PCR - Stool, Per Rectum; Future  -     Enteric Bacterial Panel - Stool, Per Rectum; Future  -     Fecal Lactoferrin Qual. - Stool, Per Rectum; Future  -     Enteric Parasite Panel - Stool, Per Rectum; Future  -     GABRIEL by IFA, Reflex 9-biomarkers profile; Future    5. Arthritis  Comments:  will check labs for underlying cause, may be due to treatment with fosamax/- MSK pain     Orders:  -     Diclofenac Sodium (VOLTAREN) 1 % gel  gel; Apply 4 g topically to the appropriate area as directed 4 (Four) Times a Day As Needed (pain).  Dispense: 50 g; Refill: 3  -     cyclobenzaprine (FLEXERIL) 10 MG tablet; Take 0.5-1 tablets by mouth At Night As Needed for Muscle Spasms.  Dispense: 20 tablet; Refill: 0  -     GABRIEL by IFA, Reflex 9-biomarkers profile; Future  -     Rheumatoid Factor, Quant; Future    6. Other migraine without status migrainosus, not intractable  Comments:  resume topirimate and follow up in 3 months   Orders:  -     topiramate (TOPAMAX) 25 MG tablet; Take 1 tablet by mouth 2 (Two) Times a Day.  Dispense: 90 tablet; Refill: 0    7. Osteoporosis of lumbar spine  Comments:  continue fosamax - consider medication as cause to bone/joint pain   Orders:  -     alendronate (FOSAMAX) 70 MG tablet; Take 1 tablet by mouth 1 (One) Time Per Week.  Dispense: 12 tablet; Refill: 3    8. Closed fracture of multiple ribs of right side with delayed healing, subsequent encounter  Comments:  continue to follow up with endocrinology   Orders:  -     Vitamin D 25 hydroxy; Future    9. Encounter for screening mammogram for malignant neoplasm of breast  -     Mammo Screening Digital Tomosynthesis Bilateral With CAD; Future    10. Screening for cardiovascular condition  -     Comprehensive metabolic panel; Future  -     CBC & Differential; Future    11. Screening for viral disease  -     Hepatitis C antibody; Future        Follow Up   Return for Pending lab results.      New Medications Ordered This Visit   Medications   • Diclofenac Sodium (VOLTAREN) 1 % gel gel     Sig: Apply 4 g topically to the appropriate area as directed 4 (Four) Times a Day As Needed (pain).     Dispense:  50 g     Refill:  3   • topiramate (TOPAMAX) 25 MG tablet     Sig: Take 1 tablet by mouth 2 (Two) Times a Day.     Dispense:  90 tablet     Refill:  0   • alendronate (FOSAMAX) 70 MG tablet     Sig: Take 1 tablet by mouth 1 (One) Time Per Week.     Dispense:  12 tablet     Refill:   "3   • cyclobenzaprine (FLEXERIL) 10 MG tablet     Sig: Take 0.5-1 tablets by mouth At Night As Needed for Muscle Spasms.     Dispense:  20 tablet     Refill:  0       Medications Discontinued During This Encounter   Medication Reason   • Teriparatide, Recombinant, (FORTEO) 600 MCG/2.4ML injection *Therapy completed   • hydrOXYzine (ATARAX) 50 MG tablet Duplicate order   • Linzess 72 MCG capsule capsule Dose adjustment   • alendronate (FOSAMAX) 70 MG tablet Reorder   • cyclobenzaprine (FLEXERIL) 10 MG tablet Reorder            Review of Systems   Constitutional: Positive for fatigue and unexpected weight loss. Negative for appetite change.   HENT: Negative for congestion and sinus pressure.    Eyes: Negative for blurred vision and visual disturbance.   Respiratory: Negative for cough and shortness of breath.    Cardiovascular: Negative for chest pain and leg swelling.   Gastrointestinal: Positive for abdominal distention and diarrhea. Negative for GERD and indigestion.   Genitourinary: Negative for dysuria, frequency, urgency and vaginal pain.   Musculoskeletal: Positive for arthralgias (bone pain/joint and long bone/femur).   Skin: Negative for rash and bruise.   Neurological: Positive for headache (off topamax x 1 month ). Negative for dizziness.   Psychiatric/Behavioral: Positive for sleep disturbance and stress. The patient is nervous/anxious.        Objective     Vitals:    03/13/23 1327   BP: 109/71   BP Location: Right arm   Patient Position: Sitting   Cuff Size: Adult   Pulse: 74   Temp: 98.3 °F (36.8 °C)   TempSrc: Oral   SpO2: 98%   Weight: 55.3 kg (122 lb)   Height: 165.1 cm (65\")     Body mass index is 20.3 kg/m².     Physical Exam  Constitutional:       General: She is not in acute distress.     Appearance: Normal appearance.   HENT:      Head: Normocephalic.   Cardiovascular:      Rate and Rhythm: Normal rate and regular rhythm.   Pulmonary:      Effort: Pulmonary effort is normal.      Breath sounds: " Normal breath sounds.   Musculoskeletal:         General: Normal range of motion.   Neurological:      General: No focal deficit present.      Mental Status: She is alert and oriented to person, place, and time.   Psychiatric:         Mood and Affect: Mood normal.         Behavior: Behavior normal.            Result Review                       Allergies   Allergen Reactions   • Codeine Unknown - High Severity   • Morphine Itching   • Sulfa Antibiotics Unknown - High Severity and Other (See Comments)   • Sumatriptan Unknown - High Severity      Past Medical History:   Diagnosis Date   • ADHD (attention deficit hyperactivity disorder) 2021    symptoms have progressively worsened in the past 2 years   • Allergic    • Arthritis 2020   • Calculus of kidney    • Depression 2021    Anxiety and depression have progressively  become much  worse since the onset of hyperparathyroidism and/or osteoporosis symptoms.   • Generalized anxiety disorder    • History of medical problems 2021    Sporatic multiple-gland disease. Hyperparathyroidism with 3 and a half of the 4  parathyroid glands removed in 2022. Since this condition started, I've had chronic fatigue, chronic muscle/bone pain and worsening insomnia.   • HL (hearing loss) 2022   • Insomnia, unspecified    • Irritable bowel syndrome    • Low back pain 2021    Chronic back, sacrum, neck and other musculoskeletal pain without any injury.   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 I've had at least 6 noninjury bone fractures documented in various radiology tests/procedures in 2021 and 2022.     Current Outpatient Medications   Medication Sig Dispense Refill   • alendronate (FOSAMAX) 70 MG tablet Take 1 tablet by mouth 1 (One) Time Per Week. 12 tablet 3   •  amphetamine-dextroamphetamine (ADDERALL) 10 MG tablet Take 1 tablet by mouth 2 (Two) Times a Day.     • Calcium Carbonate-Vitamin D (calcium-vitamin D) 500-200 MG-UNIT tablet per tablet Take 1 tablet by mouth Daily.     • cyclobenzaprine (FLEXERIL) 10 MG tablet Take 0.5-1 tablets by mouth At Night As Needed for Muscle Spasms. 20 tablet 0   • diazePAM (VALIUM) 5 MG tablet Take 1 tablet by mouth At Night As Needed.     • diclofenac (VOLTAREN) 75 MG EC tablet Take 1 tablet by mouth twice daily as needed 60 tablet 0   • estradiol-levonorgestrel (CLIMARAPRO) 0.045-0.015 MG/DAY Place 1 patch on the skin as directed by provider 1 (One) Time Per Week.     • hydrOXYzine (ATARAX) 50 MG tablet TAKE 1 TABLET BY MOUTH AT BEDTIME AS NEEDED FOR INSOMNIA 30 tablet 0   • linaclotide (LINZESS) 72 MCG capsule capsule Take 1 capsule by mouth Daily As Needed.     • mirtazapine (REMERON) 15 MG tablet Take 1 tablet by mouth Daily With Dinner.     • Probiotic Product capsule Take  by mouth.     • Diclofenac Sodium (VOLTAREN) 1 % gel gel Apply 4 g topically to the appropriate area as directed 4 (Four) Times a Day As Needed (pain). 50 g 3   • topiramate (TOPAMAX) 25 MG tablet Take 1 tablet by mouth 2 (Two) Times a Day. 90 tablet 0     No current facility-administered medications for this visit.     Past Surgical History:   Procedure Laterality Date   • COLONOSCOPY  2020   • ENDOMETRIAL ABLATION  2011   • LAPAROSCOPIC CHOLECYSTECTOMY     • LASER ABLATION      UTERINE   • TUBAL ABDOMINAL LIGATION Bilateral       Health Maintenance Due   Topic Date Due   • HEPATITIS C SCREENING  Never done   • MAMMOGRAM  09/22/2022      Immunization History   Administered Date(s) Administered   • COVID-19 (UNSPECIFIED) 12/01/2021, 12/29/2021   • Covid-19 (Pfizer) Gray Cap 05/24/2022   • PPD Test 02/11/2009, 02/11/2009           EMR Dragon/Transcription disclaimer:  This encounter note may contain some electronic transcription/translation of spoken language to  printed text. The electronic translation of spoken language may permit erroneous, or at times, nonsensical words or phrases to be inadvertently transcribed; Although I have reviewed the note for such errors, some may still exist.        Amara Garcia APRN

## 2023-03-14 LAB
25(OH)D3 SERPL-MCNC: 52.1 NG/ML (ref 30–100)
ALBUMIN SERPL-MCNC: 4.5 G/DL (ref 3.5–5.2)
ALBUMIN/GLOB SERPL: 1.7 G/DL
ALP SERPL-CCNC: 42 U/L (ref 39–117)
ALT SERPL W P-5'-P-CCNC: 16 U/L (ref 1–33)
ANION GAP SERPL CALCULATED.3IONS-SCNC: 11 MMOL/L (ref 5–15)
AST SERPL-CCNC: 29 U/L (ref 1–32)
BACTERIA SPEC AEROBE CULT: NO GROWTH
BILIRUB SERPL-MCNC: 0.5 MG/DL (ref 0–1.2)
BUN SERPL-MCNC: 11 MG/DL (ref 6–20)
BUN/CREAT SERPL: 12.8 (ref 7–25)
CALCIUM SPEC-SCNC: 8.3 MG/DL (ref 8.6–10.5)
CHLORIDE SERPL-SCNC: 102 MMOL/L (ref 98–107)
CHROMATIN AB SERPL-ACNC: 12 IU/ML (ref 0–14)
CO2 SERPL-SCNC: 24 MMOL/L (ref 22–29)
CREAT SERPL-MCNC: 0.86 MG/DL (ref 0.57–1)
CRP SERPL-MCNC: <0.3 MG/DL (ref 0–0.5)
EGFRCR SERPLBLD CKD-EPI 2021: 81.9 ML/MIN/1.73
ERYTHROCYTE [SEDIMENTATION RATE] IN BLOOD: 7 MM/HR (ref 0–30)
GLOBULIN UR ELPH-MCNC: 2.7 GM/DL
GLUCOSE SERPL-MCNC: 90 MG/DL (ref 65–99)
HCV AB SER DONR QL: NORMAL
POTASSIUM SERPL-SCNC: 4.4 MMOL/L (ref 3.5–5.2)
PROT SERPL-MCNC: 7.2 G/DL (ref 6–8.5)
SODIUM SERPL-SCNC: 137 MMOL/L (ref 136–145)
TSH SERPL DL<=0.05 MIU/L-ACNC: 1.6 UIU/ML (ref 0.27–4.2)
VIT B12 BLD-MCNC: 459 PG/ML (ref 211–946)

## 2023-03-15 ENCOUNTER — TELEPHONE (OUTPATIENT)
Dept: FAMILY MEDICINE CLINIC | Age: 53
End: 2023-03-15
Payer: MEDICAID

## 2023-03-15 DIAGNOSIS — Z87.442 HISTORY OF KIDNEY STONES: Primary | ICD-10-CM

## 2023-03-15 LAB
ANA SER QL IF: NEGATIVE
LABORATORY COMMENT REPORT: NORMAL

## 2023-03-15 NOTE — TELEPHONE ENCOUNTER
Caller: Josefina Nunez    Relationship: Self    Best call back number: [phone]    What is the medical concern/diagnosis:N/A    What specialty or service is being requested: UROLOGY    What is the provider, practice or medical service name: DR SANTANA    What is the office location: [place]    What is the office phone number: N/A    Any additional details: PATIENT CAME IN AND HAD LABS DONE HAD BLOOD IN HER URINE. SHE RE TESTED URINE YESTERDAY AND THERE WAS EVEN MORE BLOOD AND KEYTONES IN THERE. INCREASED URGENCY AND PAIN AS WELL. PLEASE SEND REFERRAL TO DR SANTANA ASAP. CALL PATIENT BACK AND SCHEDULE/ADVISE ASAP.

## 2023-03-15 NOTE — TELEPHONE ENCOUNTER
Pt is concerned about kidney stones and has had them in the past and had to be in the hospital for 3 days .

## 2023-03-16 ENCOUNTER — LAB (OUTPATIENT)
Dept: LAB | Facility: HOSPITAL | Age: 53
End: 2023-03-16
Payer: MEDICAID

## 2023-03-16 ENCOUNTER — TELEPHONE (OUTPATIENT)
Dept: FAMILY MEDICINE CLINIC | Age: 53
End: 2023-03-16
Payer: MEDICAID

## 2023-03-16 DIAGNOSIS — Z87.442 HISTORY OF KIDNEY STONES: ICD-10-CM

## 2023-03-16 LAB
BACTERIA UR QL AUTO: ABNORMAL /HPF
BILIRUB UR QL STRIP: ABNORMAL
CLARITY UR: CLEAR
COLOR UR: ABNORMAL
GLUCOSE UR STRIP-MCNC: ABNORMAL MG/DL
HGB UR QL STRIP.AUTO: ABNORMAL
KETONES UR QL STRIP: ABNORMAL
LEUKOCYTE ESTERASE UR QL STRIP.AUTO: ABNORMAL
NITRITE UR QL STRIP: ABNORMAL
PH UR STRIP.AUTO: ABNORMAL [PH]
PROT UR QL STRIP: ABNORMAL
RBC # UR STRIP: ABNORMAL /HPF
REF LAB TEST METHOD: ABNORMAL
SP GR UR STRIP: 1.02 (ref 1–1.03)
SQUAMOUS #/AREA URNS HPF: ABNORMAL /HPF
UROBILINOGEN UR QL STRIP: ABNORMAL
WBC # UR STRIP: ABNORMAL /HPF

## 2023-03-16 PROCEDURE — 81001 URINALYSIS AUTO W/SCOPE: CPT

## 2023-03-16 NOTE — TELEPHONE ENCOUNTER
Let shira know this   I will place a repeat urine for her to do at the lab and we will move forward with CT if appropriate

## 2023-03-20 ENCOUNTER — TELEPHONE (OUTPATIENT)
Dept: FAMILY MEDICINE CLINIC | Age: 53
End: 2023-03-20
Payer: MEDICAID

## 2023-03-21 DIAGNOSIS — R10.13 ABDOMINAL PAIN, EPIGASTRIC: Primary | ICD-10-CM

## 2023-03-21 DIAGNOSIS — Z87.442 HISTORY OF KIDNEY STONES: ICD-10-CM

## 2023-03-21 DIAGNOSIS — M54.50 LOW BACK PAIN, UNSPECIFIED BACK PAIN LATERALITY, UNSPECIFIED CHRONICITY, UNSPECIFIED WHETHER SCIATICA PRESENT: ICD-10-CM

## 2023-04-13 ENCOUNTER — TELEPHONE (OUTPATIENT)
Dept: UROLOGY | Facility: CLINIC | Age: 53
End: 2023-04-13

## 2023-04-13 NOTE — TELEPHONE ENCOUNTER
Provider: DR SANTANA  Caller: JIMBO RM  Relationship to Patient: SELF  Reason for Call: SAME DAY CANCEL-COVID

## 2023-04-17 ENCOUNTER — TELEPHONE (OUTPATIENT)
Dept: FAMILY MEDICINE CLINIC | Age: 53
End: 2023-04-17
Payer: MEDICAID

## 2023-04-17 NOTE — TELEPHONE ENCOUNTER
She wanted me to let you know that she has been trying to collect her stool in the stool sample kit and that she cant do it. She said every time she tries to she gets sick and she said for us to cancel the order for it..

## 2023-04-24 ENCOUNTER — OFFICE VISIT (OUTPATIENT)
Dept: UROLOGY | Facility: CLINIC | Age: 53
End: 2023-04-24
Payer: MEDICAID

## 2023-04-24 VITALS
BODY MASS INDEX: 19.73 KG/M2 | DIASTOLIC BLOOD PRESSURE: 77 MMHG | SYSTOLIC BLOOD PRESSURE: 127 MMHG | WEIGHT: 118.4 LBS | HEIGHT: 65 IN | TEMPERATURE: 98.6 F | HEART RATE: 86 BPM

## 2023-04-24 DIAGNOSIS — N30.10 INTERSTITIAL CYSTITIS: ICD-10-CM

## 2023-04-24 DIAGNOSIS — N20.0 NEPHROLITHIASIS: Primary | ICD-10-CM

## 2023-04-24 LAB
BACTERIA UR QL AUTO: NORMAL /HPF
BILIRUB BLD-MCNC: NEGATIVE MG/DL
CLARITY, POC: CLEAR
COLOR UR: YELLOW
EPI CELLS #/AREA URNS HPF: 0 /[HPF]
GLUCOSE UR STRIP-MCNC: NEGATIVE MG/DL
KETONES UR QL: NEGATIVE
LEUKOCYTE EST, POC: NEGATIVE
NITRITE UR-MCNC: NEGATIVE MG/ML
PH UR: 6.5 [PH] (ref 5–8)
PROT UR STRIP-MCNC: NEGATIVE MG/DL
RBC # UR STRIP: ABNORMAL /UL
RBC # UR STRIP: NORMAL /HPF
RENAL EPITHELIAL, POC: 0
SP GR UR: 1.02 (ref 1–1.03)
UNIDENT CRYS URNS QL MICRO: NORMAL /HPF
UROBILINOGEN UR QL: ABNORMAL
WBC # UR STRIP: NORMAL /HPF

## 2023-04-24 PROCEDURE — 99203 OFFICE O/P NEW LOW 30 MIN: CPT | Performed by: UROLOGY

## 2023-04-24 RX ORDER — HYDROXYZINE PAMOATE 50 MG/1
1 CAPSULE ORAL NIGHTLY PRN
COMMUNITY
Start: 2023-04-10 | End: 2023-04-24

## 2023-04-24 RX ORDER — DEXTROAMPHETAMINE SACCHARATE, AMPHETAMINE ASPARTATE, DEXTROAMPHETAMINE SULFATE AND AMPHETAMINE SULFATE 5; 5; 5; 5 MG/1; MG/1; MG/1; MG/1
1 TABLET ORAL 2 TIMES DAILY
COMMUNITY
Start: 2023-04-14

## 2023-04-24 NOTE — PROGRESS NOTES
"Chief Complaint  Establish Care, Nephrolithiasis (HX OF ), and Cystitis (HX OF)    Suprapubic pain    Subjective No acute distress        Josefina Nunez presents to [facility]  History of Present Illness    53-year-old white female started having suprapubic discomfort and burning about 8 weeks ago.  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  She had a urine culture which was negative.  She had no nausea vomiting and no fever.  Patient has history of ureteral calculus about 10 years ago.  Patient was taking Azo and it was not helping her.    Patient had a cystoscopy done by Dr. Lockhart several years ago and was told that she has interstitial cystitis.  Patient has been through anxiety and tension last year.  Patient has osteoporosis and has pain in her bones all the time.  Patient had parathyroidectomy and only has half of the parathyroid gland and her calcium has been lower than normal.  She was feeling pretty bad 2 weeks ago but today she is improved.    Objective No acute distress  Vital Signs:   /77   Pulse 86   Temp 98.6 °F (37 °C) (Temporal)   Ht 165.1 cm (65\")   Wt 53.7 kg (118 lb 6.4 oz)   BMI 19.70 kg/m²     Allergies   Allergen Reactions   • Codeine Unknown - High Severity   • Morphine Itching   • Sulfa Antibiotics Unknown - High Severity and Other (See Comments)   • Sumatriptan Unknown - High Severity      Past medical history:  has a past medical history of ADHD (attention deficit hyperactivity disorder) (2021), Allergic, Arthritis (2020), Calculus of kidney, Depression (2021), Fibroid (Uterine), Generalized anxiety disorder, History of medical problems (2021), HL (hearing loss) (2022), 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   Past surgical history:  has a past surgical history that includes Tubal ligation (Bilateral); Laser ablation; Laparoscopic cholecystectomy; Endometrial ablation (2011); Colonoscopy (2020); Kidney stone surgery (11/2014 stone removal and stent placement); and Cystoscopy (2013- Dr Lockhart).  Personal history: family history includes Alcohol abuse in her father; Anxiety disorder in her father; Breast cancer in her maternal grandmother; Cancer in her father, maternal aunt, maternal grandmother, and mother; Colon cancer in her father; Early death in her maternal grandfather, mother, and paternal grandfather; Heart attack in her maternal grandmother; Heart disease in her maternal grandfather and paternal grandfather; Mental illness in her father; Miscarriages / Stillbirths in her mother; Other in her maternal grandmother; Stroke in her maternal grandfather.  Social history:  reports that she quit smoking about 8 years ago. Her smoking use included cigarettes. She has a 10.00 pack-year smoking history. She has been exposed to tobacco smoke. She has never used smokeless tobacco. She reports current alcohol use of about 6.0 - 8.0 standard drinks per week. She reports that she does not use drugs.    Review of Systems    Please see past medical and surgical history rest of the system is negative    Physical Exam  Constitutional:       General: She is not in acute distress.     Appearance: Normal appearance. She is normal weight. She is not ill-appearing or toxic-appearing.   HENT:      Head: Normocephalic and atraumatic.      Ears:      Comments: No loss of hearing  Cardiovascular:      Rate and Rhythm: Normal rate and regular rhythm.      Pulses: Normal pulses.      Heart sounds: Normal heart sounds. No murmur heard.  Pulmonary:      Effort: Pulmonary effort is normal.       Breath sounds: Normal breath sounds. No rhonchi or rales.   Abdominal:      Palpations: Abdomen is soft. There is no mass.      Tenderness: There is abdominal tenderness. There is no right CVA tenderness or left CVA tenderness.      Hernia: No hernia is present.      Comments: Slight tenderness in the suprapubic area   Musculoskeletal:         General: No swelling. Normal range of motion.      Cervical back: Normal range of motion and neck supple. No rigidity or tenderness.      Right lower leg: No edema.      Left lower leg: No edema.   Lymphadenopathy:      Cervical: No cervical adenopathy.   Skin:     General: Skin is warm.      Coloration: Skin is not jaundiced.   Neurological:      General: No focal deficit present.      Mental Status: She is alert and oriented to person, place, and time.      Motor: No weakness.      Gait: Gait normal.   Psychiatric:         Mood and Affect: Mood normal.         Behavior: Behavior normal.         Thought Content: Thought content normal.         Judgment: Judgment normal.        Result Review :                 Assessment and Plan    Diagnoses and all orders for this visit:    1. Nephrolithiasis (Primary)  -     POC Urinalysis Dipstick    2. Interstitial cystitis      I think her symptoms are coming from interstitial cystitis.  I have given her diet for IC and I have offered her to have a cystoscopy on her event tomorrow and if she has ICD I will be happy to give her bladder cocktail with heparin which should help her right away.    Patient claims that she is much better than 2 weeks ago and she wants to wait till she gets better again before going to have a cystoscopy.  She is going to call me when she has more problems we will be happy to do examination and if there is no infection can do cystoscopy at that time.    Spent more than 30 minutes on the patient  Brief Urine Lab Results  (Last result in the past 365 days)      Color   Clarity   Blood   Leuk Est   Nitrite   Protein    CREAT   Urine HCG        04/24/23 1550 Yellow   Clear   Trace   Negative   Negative   Negative                  Follow Up   No follow-ups on file.  Patient was given instructions and counseling regarding her condition or for health maintenance advice. Please see specific information pulled into the AVS if appropriate.     Jai Lujan MD

## 2023-05-02 ENCOUNTER — TELEPHONE (OUTPATIENT)
Dept: FAMILY MEDICINE CLINIC | Age: 53
End: 2023-05-02

## 2023-05-08 ENCOUNTER — OFFICE VISIT (OUTPATIENT)
Dept: FAMILY MEDICINE CLINIC | Age: 53
End: 2023-05-08
Payer: MEDICAID

## 2023-05-08 VITALS
TEMPERATURE: 98.1 F | HEART RATE: 86 BPM | WEIGHT: 118.4 LBS | SYSTOLIC BLOOD PRESSURE: 120 MMHG | HEIGHT: 65 IN | DIASTOLIC BLOOD PRESSURE: 77 MMHG | OXYGEN SATURATION: 100 % | BODY MASS INDEX: 19.73 KG/M2

## 2023-05-08 DIAGNOSIS — J01.91 ACUTE RECURRENT SINUSITIS, UNSPECIFIED LOCATION: ICD-10-CM

## 2023-05-08 DIAGNOSIS — J02.9 SORE THROAT: ICD-10-CM

## 2023-05-08 DIAGNOSIS — R52 BODY ACHES: ICD-10-CM

## 2023-05-08 DIAGNOSIS — J30.1 ALLERGIC RHINITIS DUE TO POLLEN, UNSPECIFIED SEASONALITY: Primary | ICD-10-CM

## 2023-05-08 LAB
EXPIRATION DATE: NORMAL
EXPIRATION DATE: NORMAL
FLUAV AG UPPER RESP QL IA.RAPID: NOT DETECTED
FLUBV AG UPPER RESP QL IA.RAPID: NOT DETECTED
INTERNAL CONTROL: NORMAL
INTERNAL CONTROL: NORMAL
Lab: NORMAL
Lab: NORMAL
S PYO AG THROAT QL: NEGATIVE
SARS-COV-2 AG UPPER RESP QL IA.RAPID: NOT DETECTED

## 2023-05-08 PROCEDURE — 87081 CULTURE SCREEN ONLY: CPT | Performed by: NURSE PRACTITIONER

## 2023-05-08 PROCEDURE — 87428 SARSCOV & INF VIR A&B AG IA: CPT | Performed by: NURSE PRACTITIONER

## 2023-05-08 PROCEDURE — 1159F MED LIST DOCD IN RCRD: CPT | Performed by: NURSE PRACTITIONER

## 2023-05-08 PROCEDURE — 99214 OFFICE O/P EST MOD 30 MIN: CPT | Performed by: NURSE PRACTITIONER

## 2023-05-08 PROCEDURE — 87147 CULTURE TYPE IMMUNOLOGIC: CPT | Performed by: NURSE PRACTITIONER

## 2023-05-08 PROCEDURE — 1160F RVW MEDS BY RX/DR IN RCRD: CPT | Performed by: NURSE PRACTITIONER

## 2023-05-08 PROCEDURE — 87880 STREP A ASSAY W/OPTIC: CPT | Performed by: NURSE PRACTITIONER

## 2023-05-08 RX ORDER — MONTELUKAST SODIUM 10 MG/1
10 TABLET ORAL NIGHTLY
Qty: 90 TABLET | Refills: 3 | Status: SHIPPED | OUTPATIENT
Start: 2023-05-08

## 2023-05-08 RX ORDER — CEFDINIR 300 MG/1
300 CAPSULE ORAL 2 TIMES DAILY
Qty: 10 CAPSULE | Refills: 0 | Status: SHIPPED | OUTPATIENT
Start: 2023-05-08 | End: 2023-05-13

## 2023-05-08 RX ORDER — FLUCONAZOLE 150 MG/1
150 TABLET ORAL ONCE
Qty: 1 TABLET | Refills: 0 | Status: SHIPPED | OUTPATIENT
Start: 2023-05-08 | End: 2023-05-08

## 2023-05-08 NOTE — PROGRESS NOTES
Chief Complaint  Josefina Nunez presents to [facility] for Arthritis (Medication follow up ), Sore Throat (X 2 days ), Nasal Congestion (X 2 days ), Fatigue (X 1 day), and Generalized Body Aches (X 1 day)      Subjective     History of Present Illness  Josefina  here today for concerns of URI symptoms  or possible covid / flu.      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  daily  Date of symptoms start?   Worse over the last 2-3 days;  3-4 weeks  Or longer    (Symptoms may appear 2-14 days after exposure )    Fever or chills?  No  Cough?   no  Shortness of breath or difficulty breathing?  no  Fatigue?   yes  Muscle or body aches?  yes   Headache?   yes  New loss of taste or smell? no  Sore throat?  yes  Congestion or runny nose? yes  Nausea or vomiting?   no  Diarrhea?   no    Taking any medications at home to help with symptoms?Yes, describe: OTC antihistamine, dimetapp  Any prior vaccine to covid?   yes  Any prior vaccine to flu this season? no  Any significant health problems / existing lung / heart problems?  No      Any  emergency warning signs for COVID-19. No  Trouble breathing?    Persistent pain or pressure in the chest?    New confusion?   Inability to wake or stay awake?  Pale, gray, or blue-colored skin, lips, or nail beds, depending on skin tone?      She has never been allergy tested.  Has been trying OTC  Will use neti pot.   Reports having frequent URIs in past.  She is interested in allergy testing  And evaluation               Assessment and Plan       Diagnoses and all orders for this visit:    1. Allergic rhinitis due to pollen, unspecified seasonality (Primary)  Comments:  referral to allergist, start on singulair/ continue allegra   Orders:  -     montelukast (Singulair) 10 MG tablet; Take 1 tablet by mouth Every Night.  Dispense: 90 tablet; Refill: 3  -     Ambulatory Referral to Allergy  -     fluconazole (Diflucan) 150  "MG tablet; Take 1 tablet by mouth 1 (One) Time for 1 dose.  Dispense: 1 tablet; Refill: 0    2. Sore throat  -     POCT rapid strep A  -     Beta Strep Culture, Throat - , Throat; Future  -     Beta Strep Culture, Throat - Swab, Throat    3. Body aches  -     POCT SARS-CoV-2 Antigen ANIKA + Flu    4. Acute recurrent sinusitis, unspecified location  -     cefdinir (OMNICEF) 300 MG capsule; Take 1 capsule by mouth 2 (Two) Times a Day for 5 days.  Dispense: 10 capsule; Refill: 0        Follow Up   Return in about 6 months (around 11/8/2023), or if symptoms worsen or fail to improve, for Annual physical, Recheck.      New Medications Ordered This Visit   Medications   • montelukast (Singulair) 10 MG tablet     Sig: Take 1 tablet by mouth Every Night.     Dispense:  90 tablet     Refill:  3   • cefdinir (OMNICEF) 300 MG capsule     Sig: Take 1 capsule by mouth 2 (Two) Times a Day for 5 days.     Dispense:  10 capsule     Refill:  0   • fluconazole (Diflucan) 150 MG tablet     Sig: Take 1 tablet by mouth 1 (One) Time for 1 dose.     Dispense:  1 tablet     Refill:  0       There are no discontinued medications.         Review of Systems    Objective     Vitals:    05/08/23 1341   BP: 120/77   BP Location: Left arm   Patient Position: Sitting   Cuff Size: Adult   Pulse: 86   Temp: 98.1 °F (36.7 °C)   TempSrc: Oral   SpO2: 100%   Weight: 53.7 kg (118 lb 6.4 oz)   Height: 165.1 cm (65\")     Body mass index is 19.7 kg/m².     Physical Exam  Constitutional:       General: She is not in acute distress.     Appearance: Normal appearance.   HENT:      Head: Normocephalic.   Cardiovascular:      Rate and Rhythm: Normal rate and regular rhythm.   Pulmonary:      Effort: Pulmonary effort is normal.      Breath sounds: Normal breath sounds.   Musculoskeletal:         General: Normal range of motion.   Neurological:      General: No focal deficit present.      Mental Status: She is alert and oriented to person, place, and time. "   Psychiatric:         Mood and Affect: Mood normal.         Behavior: Behavior normal.            Result Review                       Allergies   Allergen Reactions   • Codeine Unknown - High Severity   • Morphine Itching   • Sulfa Antibiotics Unknown - High Severity and Other (See Comments)   • Sumatriptan Unknown - High Severity      Past Medical History:   Diagnosis Date   • ADHD (attention deficit hyperactivity disorder) 2021    symptoms have progressively worsened in the past 2 years   • Allergic    • Arthritis 2020   • Calculus of kidney    • Depression 2021    Anxiety and depression have progressively  become much  worse since the onset of hyperparathyroidism and/or osteoporosis symptoms.   • Fibroid Uterine   • Generalized anxiety disorder    • History of medical problems 2021    Sporatic multiple-gland disease. Hyperparathyroidism with 3 and a half of the 4  parathyroid glands removed in 2022. Since this condition started, I've had chronic fatigue, chronic muscle/bone pain and worsening insomnia.   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 I've had at least 6 noninjury bone fractures documented in various radiology tests/procedures in 2021 and 2022.   • Urinary tract infection      Current Outpatient Medications   Medication Sig Dispense Refill   • alendronate (FOSAMAX) 70 MG tablet Take 1 tablet by mouth 1 (One) Time Per Week. 12 tablet 3   • amphetamine-dextroamphetamine (ADDERALL) 20 MG tablet Take 1 tablet  by mouth 2 (Two) Times a Day.     • Calcium Carbonate-Vitamin D (calcium-vitamin D) 500-200 MG-UNIT tablet per tablet Take 1 tablet by mouth Daily.     • cyclobenzaprine (FLEXERIL) 10 MG tablet Take 0.5-1 tablets by mouth At Night As Needed for Muscle Spasms. 20 tablet 0   • diazePAM (VALIUM) 5 MG tablet Take 1 tablet by mouth At Night As Needed.     • estradiol-levonorgestrel (CLIMARAPRO) 0.045-0.015 MG/DAY Place 1 patch on the skin as directed by provider 1 (One) Time Per Week.     • hydrOXYzine (ATARAX) 50 MG tablet TAKE 1 TABLET BY MOUTH AT BEDTIME AS NEEDED FOR INSOMNIA 30 tablet 0   • mirtazapine (REMERON) 15 MG tablet Take 1 tablet by mouth Daily With Dinner.     • Probiotic Product capsule Take  by mouth.     • topiramate (TOPAMAX) 25 MG tablet Take 1 tablet by mouth 2 (Two) Times a Day. 90 tablet 0   • montelukast (Singulair) 10 MG tablet Take 1 tablet by mouth Every Night. 90 tablet 3     No current facility-administered medications for this visit.     Past Surgical History:   Procedure Laterality Date   • COLONOSCOPY  2020   • CYSTOSCOPY  2013- Dr Lockhart   • ENDOMETRIAL ABLATION  2011   • KIDNEY STONE SURGERY  11/2014 stone removal and stent placement   • LAPAROSCOPIC CHOLECYSTECTOMY     • LASER ABLATION      UTERINE   • TUBAL ABDOMINAL LIGATION Bilateral       Health Maintenance Due   Topic Date Due   • TDAP/TD VACCINES (1 - Tdap) Never done   • COVID-19 Vaccine (4 - Booster) 07/19/2022   • MAMMOGRAM  09/22/2022      Immunization History   Administered Date(s) Administered   • COVID-19 (UNSPECIFIED) 12/01/2021, 12/29/2021   • Covid-19 (Pfizer) Gray Cap Monovalent 05/24/2022   • PPD Test 02/11/2009, 02/11/2009         Part of this note may be an electronic transcription/translation of spoken language to printed   text using the Dragon Dictation System.      MIGDALIA Carlisle

## 2023-05-12 LAB — BACTERIA SPEC AEROBE CULT: NORMAL

## 2023-05-19 ENCOUNTER — OFFICE VISIT (OUTPATIENT)
Dept: FAMILY MEDICINE CLINIC | Age: 53
End: 2023-05-19
Payer: MEDICAID

## 2023-05-19 VITALS
HEART RATE: 102 BPM | BODY MASS INDEX: 19.09 KG/M2 | WEIGHT: 114.6 LBS | DIASTOLIC BLOOD PRESSURE: 83 MMHG | HEIGHT: 65 IN | TEMPERATURE: 98.9 F | SYSTOLIC BLOOD PRESSURE: 135 MMHG | OXYGEN SATURATION: 99 %

## 2023-05-19 DIAGNOSIS — J01.91 ACUTE RECURRENT SINUSITIS, UNSPECIFIED LOCATION: Primary | ICD-10-CM

## 2023-05-19 DIAGNOSIS — J30.1 ALLERGIC RHINITIS DUE TO POLLEN, UNSPECIFIED SEASONALITY: ICD-10-CM

## 2023-05-19 PROCEDURE — 1159F MED LIST DOCD IN RCRD: CPT | Performed by: NURSE PRACTITIONER

## 2023-05-19 PROCEDURE — 1160F RVW MEDS BY RX/DR IN RCRD: CPT | Performed by: NURSE PRACTITIONER

## 2023-05-19 PROCEDURE — 99214 OFFICE O/P EST MOD 30 MIN: CPT | Performed by: NURSE PRACTITIONER

## 2023-05-19 RX ORDER — DOXYCYCLINE 100 MG/1
100 CAPSULE ORAL 2 TIMES DAILY
Qty: 14 CAPSULE | Refills: 0 | Status: SHIPPED | OUTPATIENT
Start: 2023-05-19 | End: 2023-05-26

## 2023-05-19 NOTE — PROGRESS NOTES
Chief Complaint  Josefina Nunez presents to [facility] for Cough (Pt tested self again at work for covid/ strep/ flu & was negative X 4 days ago /Coughing up green mucus ), Hoarse (X 10 days), Sore Throat (X 10 days), and Allergies (Pt required to stop all antihistamines before allergy testing )      Subjective     History of Present Illness  Sunita is here to follow-up on upper respiratory infection.  She was seen earlier in the month on 5/ 4 and was given cefdinir and reports that she did have some improvement up until about 2 or 3 days after she completed the antibiotics.  She has been trying over-the-counter allergy medication up until a few days ago as she will be undergoing allergy testing and has had to come off of her medication.  She reports sinus congestion and pressure sore throat feeling down and unable to fully recover due to her work schedule.  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  She has been using nasal spray containing steroids despite her knowledge that she was been instructed to avoid use just to help with some of her symptoms as she reports them as being severe and debilitating.  She is scheduled for official allergy test on Tuesday with family allergy and asthma.        Assessment and Plan       Diagnoses and all orders for this visit:    1. Acute recurrent sinusitis, unspecified location (Primary)  Comments:  Will have her off work until June 5 to allow time for testing and possible intervention.  We will try an alternative antibiotic to see if improves avoid steroid  Orders:  -     doxycycline (MONODOX) 100 MG capsule; Take 1 capsule by mouth 2 (Two) Times a Day for 7 days.  Dispense: 14 capsule; Refill: 0    2. Allergic rhinitis due to pollen, unspecified seasonality  Comments:  Recommend nasal saline spray daily, follow-up with allergist as scheduled        Follow Up   Return if symptoms  "worsen or fail to improve.      New Medications Ordered This Visit   Medications   • doxycycline (MONODOX) 100 MG capsule     Sig: Take 1 capsule by mouth 2 (Two) Times a Day for 7 days.     Dispense:  14 capsule     Refill:  0       There are no discontinued medications.         Review of Systems    Objective     Vitals:    05/19/23 1202   BP: 135/83   BP Location: Left arm   Patient Position: Sitting   Cuff Size: Adult   Pulse: 102   Temp: 98.9 °F (37.2 °C)   TempSrc: Oral   SpO2: 99%   Weight: 52 kg (114 lb 9.6 oz)   Height: 165.1 cm (65\")     Body mass index is 19.07 kg/m².     Physical Exam  Vitals and nursing note reviewed.   Constitutional:       Appearance: She is ill-appearing.   HENT:      Right Ear: Tympanic membrane and ear canal normal. Drainage and tenderness present. No middle ear effusion. There is no impacted cerumen. Tympanic membrane is not scarred or erythematous.      Left Ear: Tympanic membrane and ear canal normal. Drainage and tenderness present.  No middle ear effusion. There is no impacted cerumen. Tympanic membrane is not scarred or erythematous.      Nose:      Right Sinus: No maxillary sinus tenderness or frontal sinus tenderness.      Left Sinus: No maxillary sinus tenderness or frontal sinus tenderness.      Mouth/Throat:      Pharynx: No pharyngeal swelling, oropharyngeal exudate, posterior oropharyngeal erythema or uvula swelling.      Tonsils: No tonsillar exudate. 0 on the right. 0 on the left.   Cardiovascular:      Rate and Rhythm: Normal rate and regular rhythm.   Pulmonary:      Effort: Pulmonary effort is normal.      Breath sounds: Normal breath sounds. No wheezing or rhonchi.   Lymphadenopathy:      Head:      Right side of head: No submandibular or preauricular adenopathy.      Left side of head: No submandibular or preauricular adenopathy.   Skin:     General: Skin is warm and dry.   Neurological:      Mental Status: She is alert and oriented to person, place, and time. "   Psychiatric:         Attention and Perception: Attention and perception normal.         Behavior: Behavior normal.            Result Review                       Allergies   Allergen Reactions   • Codeine Unknown - High Severity   • Morphine Itching   • Sulfa Antibiotics Unknown - High Severity and Other (See Comments)   • Sumatriptan Unknown - High Severity      Past Medical History:   Diagnosis Date   • ADHD (attention deficit hyperactivity disorder) 2021    symptoms have progressively worsened in the past 2 years   • Allergic    • Arthritis 2020   • Calculus of kidney    • Depression 2021    Anxiety and depression have progressively  become much  worse since the onset of hyperparathyroidism and/or osteoporosis symptoms.   • Fibroid Uterine   • Generalized anxiety disorder    • History of medical problems 2021    Sporatic multiple-gland disease. Hyperparathyroidism with 3 and a half of the 4  parathyroid glands removed in 2022. Since this condition started, I've had chronic fatigue, chronic muscle/bone pain and worsening insomnia.   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 I've had at least 6 noninjury bone fractures documented in various radiology tests/procedures in 2021 and 2022.   • Urinary tract infection      Current Outpatient Medications   Medication Sig Dispense Refill   • alendronate (FOSAMAX) 70 MG tablet Take 1 tablet by mouth 1 (One) Time Per Week. 12 tablet 3   • amphetamine-dextroamphetamine (ADDERALL)  20 MG tablet Take 1 tablet by mouth 2 (Two) Times a Day.     • Calcium Carbonate-Vitamin D (calcium-vitamin D) 500-200 MG-UNIT tablet per tablet Take 1 tablet by mouth Daily.     • cyclobenzaprine (FLEXERIL) 10 MG tablet Take 0.5-1 tablets by mouth At Night As Needed for Muscle Spasms. 20 tablet 0   • diazePAM (VALIUM) 5 MG tablet Take 1 tablet by mouth At Night As Needed.     • estradiol-levonorgestrel (CLIMARAPRO) 0.045-0.015 MG/DAY Place 1 patch on the skin as directed by provider 1 (One) Time Per Week.     • hydrOXYzine (ATARAX) 50 MG tablet TAKE 1 TABLET BY MOUTH AT BEDTIME AS NEEDED FOR INSOMNIA 30 tablet 0   • mirtazapine (REMERON) 15 MG tablet Take 1 tablet by mouth Daily With Dinner.     • Probiotic Product capsule Take  by mouth.     • topiramate (TOPAMAX) 25 MG tablet Take 1 tablet by mouth 2 (Two) Times a Day. 90 tablet 0   • doxycycline (MONODOX) 100 MG capsule Take 1 capsule by mouth 2 (Two) Times a Day for 7 days. 14 capsule 0   • montelukast (Singulair) 10 MG tablet Take 1 tablet by mouth Every Night. (Patient not taking: Reported on 5/19/2023) 90 tablet 3     No current facility-administered medications for this visit.     Past Surgical History:   Procedure Laterality Date   • COLONOSCOPY  2020   • CYSTOSCOPY  2013- Dr Lockhart   • ENDOMETRIAL ABLATION  2011   • KIDNEY STONE SURGERY  11/2014 stone removal and stent placement   • LAPAROSCOPIC CHOLECYSTECTOMY     • LASER ABLATION      UTERINE   • TUBAL ABDOMINAL LIGATION Bilateral       Health Maintenance Due   Topic Date Due   • TDAP/TD VACCINES (1 - Tdap) Never done   • COVID-19 Vaccine (4 - Booster) 07/19/2022   • MAMMOGRAM  09/22/2022      Immunization History   Administered Date(s) Administered   • COVID-19 (UNSPECIFIED) 12/01/2021, 12/29/2021   • Covid-19 (Pfizer) Gray Cap Monovalent 05/24/2022   • PPD Test 02/11/2009, 02/11/2009         Part of this note may be an electronic transcription/translation of spoken language to printed   text using  the Dragon Dictation System.      Amara Garcia, APRN

## 2023-05-19 NOTE — LETTER
May 19, 2023     Patient: Josefina Nunez   YOB: 1970   Date of Visit: 5/19/2023       To Whom It May Concern:    It is my medical opinion that Josefina Nunez may return to work June 5th .            Sincerely,        MIGDALIA Carlisle    CC: No Recipients

## 2023-06-15 ENCOUNTER — TELEPHONE (OUTPATIENT)
Dept: FAMILY MEDICINE CLINIC | Age: 53
End: 2023-06-15
Payer: MEDICAID

## 2023-06-15 NOTE — TELEPHONE ENCOUNTER
[facility] to inform pt about no show for Mammo on 06/07/2023 .. gave pt number to call back and reschedule .. 1st attempt.

## 2023-08-24 ENCOUNTER — HOSPITAL ENCOUNTER (OUTPATIENT)
Dept: MAMMOGRAPHY | Facility: HOSPITAL | Age: 53
Discharge: HOME | End: 2023-08-24
Admitting: NURSE PRACTITIONER
Payer: MEDICAID

## 2023-08-24 DIAGNOSIS — Z12.31 ENCOUNTER FOR SCREENING MAMMOGRAM FOR MALIGNANT NEOPLASM OF BREAST: ICD-10-CM

## 2023-08-24 PROCEDURE — 77067 SCR MAMMO BI INCL CAD: CPT

## 2023-08-24 PROCEDURE — 77063 BREAST TOMOSYNTHESIS BI: CPT

## 2023-09-18 ENCOUNTER — OFFICE VISIT (OUTPATIENT)
Dept: FAMILY MEDICINE CLINIC | Age: 53
End: 2023-09-18
Payer: MEDICAID

## 2023-09-18 VITALS
DIASTOLIC BLOOD PRESSURE: 84 MMHG | OXYGEN SATURATION: 97 % | WEIGHT: 115.6 LBS | HEIGHT: 65 IN | SYSTOLIC BLOOD PRESSURE: 127 MMHG | TEMPERATURE: 98.4 F | HEART RATE: 93 BPM | BODY MASS INDEX: 19.26 KG/M2

## 2023-09-18 DIAGNOSIS — G43.019 INTRACTABLE MIGRAINE WITHOUT AURA AND WITHOUT STATUS MIGRAINOSUS: Primary | ICD-10-CM

## 2023-09-18 DIAGNOSIS — M81.0 OSTEOPOROSIS OF LUMBAR SPINE: ICD-10-CM

## 2023-09-18 DIAGNOSIS — R10.13 ABDOMINAL PAIN, EPIGASTRIC: ICD-10-CM

## 2023-09-18 DIAGNOSIS — R19.7 DIARRHEA, UNSPECIFIED TYPE: ICD-10-CM

## 2023-09-18 DIAGNOSIS — Z13.6 SCREENING FOR CARDIOVASCULAR CONDITION: ICD-10-CM

## 2023-09-18 PROCEDURE — 99214 OFFICE O/P EST MOD 30 MIN: CPT | Performed by: NURSE PRACTITIONER

## 2023-09-18 RX ORDER — ALENDRONATE SODIUM 70 MG/1
70 TABLET ORAL WEEKLY
Qty: 12 TABLET | Refills: 3 | Status: SHIPPED | OUTPATIENT
Start: 2023-09-18

## 2023-09-18 RX ORDER — DILTIAZEM HYDROCHLORIDE 60 MG/1
2 TABLET, FILM COATED ORAL AS NEEDED
COMMUNITY
Start: 2023-08-29

## 2023-09-18 RX ORDER — FLUTICASONE PROPIONATE 50 MCG
2 SPRAY, SUSPENSION (ML) NASAL AS NEEDED
COMMUNITY
Start: 2023-05-30

## 2023-09-18 RX ORDER — ALBUTEROL SULFATE 90 UG/1
2 AEROSOL, METERED RESPIRATORY (INHALATION) EVERY 4 HOURS PRN
COMMUNITY
Start: 2023-08-29

## 2023-09-18 RX ORDER — OLOPATADINE HYDROCHLORIDE 1 MG/ML
1 SOLUTION/ DROPS OPHTHALMIC 2 TIMES DAILY
COMMUNITY
Start: 2023-08-16

## 2023-09-18 RX ORDER — EPINEPHRINE 0.3 MG/.3ML
0.3 INJECTION SUBCUTANEOUS ONCE
COMMUNITY
Start: 2023-05-30

## 2023-09-18 RX ORDER — DICYCLOMINE HYDROCHLORIDE 10 MG/1
10 CAPSULE ORAL
Qty: 30 CAPSULE | Refills: 1 | Status: SHIPPED | OUTPATIENT
Start: 2023-09-18

## 2023-09-18 RX ORDER — RIMEGEPANT SULFATE 75 MG/75MG
75 TABLET, ORALLY DISINTEGRATING ORAL EVERY OTHER DAY
Qty: 15 TABLET | Refills: 5 | Status: SHIPPED | OUTPATIENT
Start: 2023-09-18

## 2023-09-18 NOTE — PROGRESS NOTES
Chief Complaint  Josefina Nunez presents to [facility] for Insomnia (6 mo.f/u ), Osteoarthritis, Migraine (Pt requesting referral due to frequent migraines/), and Med Refill (Requesting refill on fosamax & flexeril )      Subjective     History of Present Illness  Regarding insomnia, taking hydroxyzine and reports working well.     Allergic rhinitis- taking singulair  referred to allergist for evaluation and recommendations  recommended allergy injections.  She reports today that this has     Continues to take fosamax along with Calcium and Vitamin D for osteoporosis    Taking adderall for her ADD; valium nightly PRN for her anxiety  managed by Ronel machado for now      She has been having frequent migraines- previously on topamax 25 BID but stopped about 2 months ago.  Was on 50 mg at the time.  Now interested in referral for long term treatment.  Has been waking up with nausea.  Lockhart was causing her to lose weight .         Assessment and Plan       Diagnoses and all orders for this visit:    1. Intractable migraine without aura and without status migrainosus (Primary)  Comments:  will try nurtec due to failed treatment topamax, imitrex in past  referral to Neurology unless treatment is effective.  Orders:  -     Rimegepant Sulfate (Nurtec) 75 MG tablet dispersible tablet; Take 1 tablet by mouth Every Other Day.  Dispense: 15 tablet; Refill: 5  -     Ambulatory Referral to Neurology    2. Diarrhea, unspecified type  Comments:  we will do a trial of bentyl;  if no improvement may consider colestipol otherwise, suspect IBS with diarrhea  may need referral to gastro  Orders:  -     dicyclomine (BENTYL) 10 MG capsule; Take 1 capsule by mouth 4 (Four) Times a Day Before Meals & at Bedtime As Needed for Abdominal Cramping.  Dispense: 30 capsule; Refill: 1    3. Abdominal pain, epigastric  Comments:  bentyl trial  Orders:  -     dicyclomine (BENTYL) 10 MG capsule; Take 1  "capsule by mouth 4 (Four) Times a Day Before Meals & at Bedtime As Needed for Abdominal Cramping.  Dispense: 30 capsule; Refill: 1    4. Screening for cardiovascular condition  -     Cancel: Comprehensive metabolic panel; Future  -     Cancel: Lipid panel; Future  -     Cancel: TSH Rfx On Abnormal To Free T4; Future  -     Cancel: CBC No Differential; Future    5. Osteoporosis of lumbar spine  Comments:  continue fosamax - consider medication as cause to bone/joint pain   Orders:  -     alendronate (FOSAMAX) 70 MG tablet; Take 1 tablet by mouth 1 (One) Time Per Week.  Dispense: 12 tablet; Refill: 3            Follow Up   Return in about 8 weeks (around 11/13/2023).      New Medications Ordered This Visit   Medications    Rimegepant Sulfate (Nurtec) 75 MG tablet dispersible tablet     Sig: Take 1 tablet by mouth Every Other Day.     Dispense:  15 tablet     Refill:  5    dicyclomine (BENTYL) 10 MG capsule     Sig: Take 1 capsule by mouth 4 (Four) Times a Day Before Meals & at Bedtime As Needed for Abdominal Cramping.     Dispense:  30 capsule     Refill:  1    alendronate (FOSAMAX) 70 MG tablet     Sig: Take 1 tablet by mouth 1 (One) Time Per Week.     Dispense:  12 tablet     Refill:  3       Medications Discontinued During This Encounter   Medication Reason    topiramate (TOPAMAX) 25 MG tablet     alendronate (FOSAMAX) 70 MG tablet Reorder          Review of Systems    Objective     Vitals:    09/18/23 1557   BP: 127/84   BP Location: Left arm   Patient Position: Sitting   Cuff Size: Adult   Pulse: 93   Temp: 98.4 °F (36.9 °C)   TempSrc: Oral   SpO2: 97%   Weight: 52.4 kg (115 lb 9.6 oz)   Height: 165.1 cm (65\")     Body mass index is 19.24 kg/m².   BMI is within normal parameters. No other follow-up for BMI required.      Physical Exam  Constitutional:       General: She is not in acute distress.     Appearance: Normal appearance.   HENT:      Head: Normocephalic.   Cardiovascular:      Rate and Rhythm: Normal " rate and regular rhythm.   Pulmonary:      Effort: Pulmonary effort is normal.      Breath sounds: Normal breath sounds.   Musculoskeletal:         General: Normal range of motion.   Neurological:      General: No focal deficit present.      Mental Status: She is alert and oriented to person, place, and time.   Psychiatric:         Mood and Affect: Mood normal.         Behavior: Behavior normal.          Result Review               Allergies   Allergen Reactions    Codeine Unknown - High Severity    Morphine Itching    Sulfa Antibiotics Unknown - High Severity and Other (See Comments)    Sumatriptan Unknown - High Severity      Past Medical History:   Diagnosis Date    ADHD (attention deficit hyperactivity disorder) 2021    symptoms have progressively worsened in the past 2 years    Allergic     Arthritis 2020    Calculus of kidney     Depression 2021    Anxiety and depression have progressively  become much  worse since the onset of hyperparathyroidism and/or osteoporosis symptoms.    Fibroid Uterine    Generalized anxiety disorder     History of medical problems 2021    Sporatic multiple-gland disease. Hyperparathyroidism with 3 and a half of the 4  parathyroid glands removed in 2022. Since this condition started, I've had chronic fatigue, chronic muscle/bone pain and worsening insomnia.    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 I've had at least 6 noninjury bone fractures documented in various radiology tests/procedures in  2021 and 2022.    Urinary tract infection      Current Outpatient Medications   Medication Sig Dispense Refill    albuterol sulfate  (90 Base) MCG/ACT inhaler Inhale 2 puffs Every 4 (Four) Hours As Needed.      alendronate (FOSAMAX) 70 MG tablet Take 1 tablet by mouth 1 (One) Time Per Week. 12 tablet 3    amphetamine-dextroamphetamine (ADDERALL) 20 MG tablet Take 1 tablet by mouth 2 (Two) Times a Day.      Calcium Carbonate-Vitamin D (calcium-vitamin D) 500-200 MG-UNIT tablet per tablet Take 1 tablet by mouth Daily.      cyclobenzaprine (FLEXERIL) 10 MG tablet Take 0.5-1 tablets by mouth At Night As Needed for Muscle Spasms. 20 tablet 0    diazePAM (VALIUM) 5 MG tablet Take 1 tablet by mouth At Night As Needed.      EPINEPHrine (EPIPEN) 0.3 MG/0.3ML solution auto-injector injection Inject 0.3 mL into the appropriate muscle as directed by prescriber 1 (One) Time.      estradiol-levonorgestrel (CLIMARAPRO) 0.045-0.015 MG/DAY Place 1 patch on the skin as directed by provider 1 (One) Time Per Week.      fluticasone (FLONASE) 50 MCG/ACT nasal spray 2 sprays As Needed.      hydrOXYzine (ATARAX) 50 MG tablet TAKE 1 TABLET BY MOUTH AT BEDTIME AS NEEDED FOR INSOMNIA 30 tablet 0    mirtazapine (REMERON) 15 MG tablet Take 1 tablet by mouth Daily With Dinner.      montelukast (Singulair) 10 MG tablet Take 1 tablet by mouth Every Night. 90 tablet 3    olopatadine (PATANOL) 0.1 % ophthalmic solution Administer 1 drop to both eyes 2 (Two) Times a Day.      Probiotic Product capsule Take  by mouth.      Symbicort 80-4.5 MCG/ACT inhaler Inhale 2 puffs As Needed.      dicyclomine (BENTYL) 10 MG capsule Take 1 capsule by mouth 4 (Four) Times a Day Before Meals & at Bedtime As Needed for Abdominal Cramping. 30 capsule 1    Rimegepant Sulfate (Nurtec) 75 MG tablet dispersible tablet Take 1 tablet by mouth Every Other Day. 15 tablet 5     No current facility-administered medications for this visit.     Past Surgical History:    Procedure Laterality Date    COLONOSCOPY  2020    CYSTOSCOPY  2013- Dr Lockhart    ENDOMETRIAL ABLATION  2011    KIDNEY STONE SURGERY  11/2014 stone removal and stent placement    LAPAROSCOPIC CHOLECYSTECTOMY      LASER ABLATION      UTERINE    TUBAL ABDOMINAL LIGATION Bilateral       Health Maintenance Due   Topic Date Due    TDAP/TD VACCINES (1 - Tdap) Never done      Immunization History   Administered Date(s) Administered    COVID-19 (UNSPECIFIED) 12/01/2021, 12/29/2021    Covid-19 (Pfizer) Gray Cap Monovalent 05/24/2022    PPD Test 02/11/2009, 02/11/2009         Part of this note may be an electronic transcription/translation of spoken language to printed   text using the Dragon Dictation System.      Amara Garcia, APRN

## 2023-09-19 ENCOUNTER — PRIOR AUTHORIZATION (OUTPATIENT)
Dept: FAMILY MEDICINE CLINIC | Age: 53
End: 2023-09-19
Payer: MEDICAID

## 2023-10-30 ENCOUNTER — OFFICE VISIT (OUTPATIENT)
Dept: FAMILY MEDICINE CLINIC | Age: 53
End: 2023-10-30
Payer: MEDICAID

## 2023-10-30 ENCOUNTER — HOSPITAL ENCOUNTER (OUTPATIENT)
Dept: GENERAL RADIOLOGY | Facility: HOSPITAL | Age: 53
Discharge: HOME | End: 2023-10-30
Admitting: NURSE PRACTITIONER
Payer: MEDICAID

## 2023-10-30 VITALS
TEMPERATURE: 98.1 F | HEART RATE: 84 BPM | DIASTOLIC BLOOD PRESSURE: 81 MMHG | HEIGHT: 65 IN | WEIGHT: 116.8 LBS | SYSTOLIC BLOOD PRESSURE: 120 MMHG | OXYGEN SATURATION: 99 % | BODY MASS INDEX: 19.46 KG/M2

## 2023-10-30 DIAGNOSIS — R20.0 NUMBNESS AND TINGLING: ICD-10-CM

## 2023-10-30 DIAGNOSIS — R20.2 NUMBNESS AND TINGLING: ICD-10-CM

## 2023-10-30 DIAGNOSIS — M79.601 RIGHT ARM PAIN: Primary | ICD-10-CM

## 2023-10-30 DIAGNOSIS — K13.0 MUCOCELE OF LOWER LIP: ICD-10-CM

## 2023-10-30 DIAGNOSIS — M79.601 RIGHT ARM PAIN: ICD-10-CM

## 2023-10-30 DIAGNOSIS — R10.13 ABDOMINAL PAIN, EPIGASTRIC: ICD-10-CM

## 2023-10-30 DIAGNOSIS — R19.7 DIARRHEA, UNSPECIFIED TYPE: ICD-10-CM

## 2023-10-30 PROCEDURE — 99214 OFFICE O/P EST MOD 30 MIN: CPT | Performed by: NURSE PRACTITIONER

## 2023-10-30 PROCEDURE — 1160F RVW MEDS BY RX/DR IN RCRD: CPT | Performed by: NURSE PRACTITIONER

## 2023-10-30 PROCEDURE — 1159F MED LIST DOCD IN RCRD: CPT | Performed by: NURSE PRACTITIONER

## 2023-10-30 PROCEDURE — 72040 X-RAY EXAM NECK SPINE 2-3 VW: CPT

## 2023-10-30 RX ORDER — DICYCLOMINE HYDROCHLORIDE 10 MG/1
10 CAPSULE ORAL
Qty: 30 CAPSULE | Refills: 1 | Status: SHIPPED | OUTPATIENT
Start: 2023-10-30

## 2023-10-30 RX ORDER — HYDROXYZINE PAMOATE 50 MG/1
1 CAPSULE ORAL EVERY 12 HOURS SCHEDULED
COMMUNITY
Start: 2023-10-16

## 2023-10-30 NOTE — PROGRESS NOTES
Chief Complaint  Josefina Nunez presents to [facility] for Arm Pain (Arm, wrist, & hand pain worsening ), Numbness (Numb/ tingling of hand throughout the day ), and Cyst (Bump on inside of lip, noticed X 3 weeks ago )      Subjective     History of Present Illness    Right arm has been having some numbness and tingling.   She does feel weakness in her arm/hand.  This has been going on for weeks but has been progressively getting worse.    Ibuprofen is not helping with her arm pain.      She recently had a follow up DEXA and showed worsening osteoporosis. She did say Endo told her possible  'hungry bone syndrome'.  She will have repeat labs then follow up     She has noticed that over the past 3 weeks, she has developed a knot on her left lower lip.  No pain- no change in size  - no smaller and bigger .  She is requesting referral for evaluation.   Assessment and Plan       Diagnoses and all orders for this visit:    1. Right arm pain (Primary)  Comments:  will check Cspine xray - if no indication as to cause, will proceed with NCS  Orders:  -     XR Spine Cervical 2 or 3 View; Future    2. Numbness and tingling  Comments:  consider nerve entrapment vs CTS  Orders:  -     XR Spine Cervical 2 or 3 View; Future    3. Mucocele of lower lip  Comments:  per request, referral for evaluation  Orders:  -     Ambulatory Referral to Oral Maxillofacial Surgery    4. Diarrhea, unspecified type  Comments:  refills sent on Bentyl  Orders:  -     dicyclomine (BENTYL) 10 MG capsule; Take 1 capsule by mouth 4 (Four) Times a Day Before Meals & at Bedtime As Needed for Abdominal Cramping.  Dispense: 30 capsule; Refill: 1    5. Abdominal pain, epigastric  Comments:  bentyl trial  Orders:  -     dicyclomine (BENTYL) 10 MG capsule; Take 1 capsule by mouth 4 (Four) Times a Day Before Meals & at Bedtime As Needed for Abdominal Cramping.  Dispense: 30 capsule; Refill: 1            Follow Up   Return for  "Pending imaging results.      New Medications Ordered This Visit   Medications    dicyclomine (BENTYL) 10 MG capsule     Sig: Take 1 capsule by mouth 4 (Four) Times a Day Before Meals & at Bedtime As Needed for Abdominal Cramping.     Dispense:  30 capsule     Refill:  1       Medications Discontinued During This Encounter   Medication Reason    hydrOXYzine (ATARAX) 50 MG tablet *Therapy completed    dicyclomine (BENTYL) 10 MG capsule *Therapy completed          Review of Systems    Objective     Vitals:    10/30/23 1455   BP: 120/81   BP Location: Left arm   Patient Position: Sitting   Cuff Size: Adult   Pulse: 84   Temp: 98.1 °F (36.7 °C)   TempSrc: Oral   SpO2: 99%   Weight: 53 kg (116 lb 12.8 oz)   Height: 165.1 cm (65\")     Body mass index is 19.44 kg/m².   BMI is within normal parameters. No other follow-up for BMI required.      Physical Exam  Constitutional:       General: She is not in acute distress.     Appearance: Normal appearance.   HENT:      Head: Normocephalic.      Mouth/Throat:      Lips: Lesions (left inner lateral lip - appearance of mucocele) present.   Cardiovascular:      Rate and Rhythm: Normal rate and regular rhythm.   Pulmonary:      Effort: Pulmonary effort is normal.      Breath sounds: Normal breath sounds.   Musculoskeletal:         General: Normal range of motion.      Cervical back: No pain with movement. Decreased range of motion (with rotation to right).   Lymphadenopathy:      Cervical:      Right cervical: No superficial or deep cervical adenopathy.     Left cervical: No superficial or deep cervical adenopathy.   Neurological:      General: No focal deficit present.      Mental Status: She is alert and oriented to person, place, and time.   Psychiatric:         Mood and Affect: Mood normal.         Behavior: Behavior normal.            Result Review               Allergies   Allergen Reactions    Codeine Unknown - High Severity    Morphine Itching    Sulfa Antibiotics Unknown - " High Severity and Other (See Comments)    Sumatriptan Unknown - High Severity      Past Medical History:   Diagnosis Date    ADHD (attention deficit hyperactivity disorder) 2021    symptoms have progressively worsened in the past 2 years    Allergic     Arthritis 2020    Calculus of kidney     Depression 2021    Anxiety and depression have progressively  become much  worse since the onset of hyperparathyroidism and/or osteoporosis symptoms.    Fibroid Uterine    Generalized anxiety disorder     History of medical problems 2021    Sporatic multiple-gland disease. Hyperparathyroidism with 3 and a half of the 4  parathyroid glands removed in 2022. Since this condition started, I've had chronic fatigue, chronic muscle/bone pain and worsening insomnia.    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 I've had at least 6 noninjury bone fractures documented in various radiology tests/procedures in 2021 and 2022.    Urinary tract infection      Current Outpatient Medications   Medication Sig Dispense Refill    albuterol sulfate  (90 Base) MCG/ACT inhaler Inhale 2 puffs Every 4 (Four) Hours As Needed.      alendronate (FOSAMAX) 70 MG tablet Take 1 tablet by mouth 1 (One) Time Per Week. 12 tablet 3    amphetamine-dextroamphetamine (ADDERALL) 20 MG tablet Take 1 tablet by mouth 2 (Two) Times a Day.      Calcium Carbonate-Vitamin D (calcium-vitamin D) 500-200 MG-UNIT tablet per tablet Take 1 tablet by mouth Daily.      cyclobenzaprine  (FLEXERIL) 10 MG tablet Take 0.5-1 tablets by mouth At Night As Needed for Muscle Spasms. 20 tablet 0    diazePAM (VALIUM) 5 MG tablet Take 1 tablet by mouth At Night As Needed.      dicyclomine (BENTYL) 10 MG capsule Take 1 capsule by mouth 4 (Four) Times a Day Before Meals & at Bedtime As Needed for Abdominal Cramping. 30 capsule 1    EPINEPHrine (EPIPEN) 0.3 MG/0.3ML solution auto-injector injection Inject 0.3 mL into the appropriate muscle as directed by prescriber 1 (One) Time.      estradiol-levonorgestrel (CLIMARAPRO) 0.045-0.015 MG/DAY Place 1 patch on the skin as directed by provider 1 (One) Time Per Week.      fluticasone (FLONASE) 50 MCG/ACT nasal spray 2 sprays As Needed.      hydrOXYzine pamoate (VISTARIL) 50 MG capsule Take 1 capsule by mouth Every 12 (Twelve) Hours.      mirtazapine (REMERON) 15 MG tablet Take 1 tablet by mouth Daily With Dinner.      montelukast (Singulair) 10 MG tablet Take 1 tablet by mouth Every Night. 90 tablet 3    olopatadine (PATANOL) 0.1 % ophthalmic solution Administer 1 drop to both eyes As Needed.      Probiotic Product capsule Take  by mouth.      Rimegepant Sulfate (Nurtec) 75 MG tablet dispersible tablet Take 1 tablet by mouth Every Other Day. 15 tablet 5    Symbicort 80-4.5 MCG/ACT inhaler Inhale 2 puffs As Needed.       No current facility-administered medications for this visit.     Past Surgical History:   Procedure Laterality Date    COLONOSCOPY  2020    CYSTOSCOPY  2013- Dr Lockhart    ENDOMETRIAL ABLATION  2011    KIDNEY STONE SURGERY  11/2014 stone removal and stent placement    LAPAROSCOPIC CHOLECYSTECTOMY      LASER ABLATION      UTERINE    TUBAL ABDOMINAL LIGATION Bilateral       Health Maintenance Due   Topic Date Due    TDAP/TD VACCINES (1 - Tdap) Never done      Immunization History   Administered Date(s) Administered    COVID-19 (UNSPECIFIED) 12/01/2021, 12/29/2021    Covid-19 (Pfizer) Gray Cap Monovalent 05/24/2022    PPD Test 02/11/2009, 02/11/2009          Part of this note may be an electronic transcription/translation of spoken language to printed   text using the Dragon Dictation System.      MIGDALIA Carlisle

## 2023-11-01 DIAGNOSIS — R20.0 NUMBNESS AND TINGLING: ICD-10-CM

## 2023-11-01 DIAGNOSIS — R20.2 NUMBNESS AND TINGLING: ICD-10-CM

## 2023-11-01 DIAGNOSIS — M79.601 RIGHT ARM PAIN: Primary | ICD-10-CM

## 2023-11-09 ENCOUNTER — TELEPHONE (OUTPATIENT)
Dept: FAMILY MEDICINE CLINIC | Age: 53
End: 2023-11-09

## 2023-11-09 NOTE — TELEPHONE ENCOUNTER
Caller: Josefina Nunez    Relationship: Self    Best call back number: [phone]     What was the call regarding: PATIENT STATES THE DENTAL SURGEON WE REFERRED HER TO WAS ACTUALLY JUST A REGULAR DENTISTS OFFICE. PATIENT STATES THE DENTAL OFFICE AGREED THAT SHE NEEDED TO SEE AN ORAL SURGEON BUT WASN'T SURE OF A LOCAL ONE THAT ACCEPTS HER INSURANCE. PATIENT STATES SHE WOULD LIKE A CALL BACK TO DISCUSS THIS AND HER OPTIONS.

## 2023-11-14 ENCOUNTER — TRANSCRIBE ORDERS (OUTPATIENT)
Dept: FAMILY MEDICINE CLINIC | Age: 53
End: 2023-11-14
Payer: MEDICAID

## 2023-11-14 DIAGNOSIS — K13.0 MUCOCELE OF LOWER LIP: Primary | ICD-10-CM

## 2023-12-11 ENCOUNTER — PRIOR AUTHORIZATION (OUTPATIENT)
Dept: FAMILY MEDICINE CLINIC | Age: 53
End: 2023-12-11
Payer: MEDICAID

## 2024-01-03 ENCOUNTER — OFFICE VISIT (OUTPATIENT)
Dept: NEUROLOGY | Facility: CLINIC | Age: 54
End: 2024-01-03
Payer: MEDICAID

## 2024-01-03 ENCOUNTER — PATIENT ROUNDING (BHMG ONLY) (OUTPATIENT)
Dept: NEUROLOGY | Facility: CLINIC | Age: 54
End: 2024-01-03
Payer: MEDICAID

## 2024-01-03 VITALS
HEART RATE: 103 BPM | BODY MASS INDEX: 19.63 KG/M2 | WEIGHT: 117.8 LBS | DIASTOLIC BLOOD PRESSURE: 93 MMHG | SYSTOLIC BLOOD PRESSURE: 134 MMHG | HEIGHT: 65 IN

## 2024-01-03 DIAGNOSIS — G43.719 INTRACTABLE CHRONIC MIGRAINE WITHOUT AURA AND WITHOUT STATUS MIGRAINOSUS: Primary | ICD-10-CM

## 2024-01-03 RX ORDER — GALCANEZUMAB 120 MG/ML
120 INJECTION, SOLUTION SUBCUTANEOUS
Qty: 1 EACH | Refills: 5 | Status: SHIPPED | OUTPATIENT
Start: 2024-01-03

## 2024-01-03 NOTE — PROGRESS NOTES
"Chief Complaint  Migraine    Subjective          Josefina Nunez presents to [facility]  History of Present Illness  She reports that she developed headaches many years ago. Since that time, her headaches have progressively worsened.   Currently, she reports headaches that are located temporal and occipital . She characterizes the headaches as 8/10 in severity, piercing and throbbing  in nature with associated photophobia, phonophobia, and nausea. She reports headaches last 8+ hours. She reports 20 headache days per month. She denies associated aura. She denies focal numbness, weakness, speech, and vision changes.   Triggers: denies any known triggers   Symptoms improved by: Dark quiet room and Sleep   She states she is sleeping well. Reports getting 7-8 hours of sleep per night. denies snoring. Reports unrefreshing sleep.   Prior prophylactic medications include: topiramate, mirtazapine, amitriptyline,  propranolol,   She  uses abortive therapy such as: imitrex, triptans contraindicated d/t risk for serotonin syndrome, nurtec,   Caffeine Use: 1 servings daily  Childbearing potential : postmenopausal   History of Kidney Stones: Yes  She denies a family history of cerebral aneurysm.            Objective   Vital Signs:   /93   Pulse 103   Ht 165.1 cm (65\")   Wt 53.4 kg (117 lb 12.8 oz)   BMI 19.60 kg/m²     Physical Exam  HENT:      Head: Normocephalic.   Pulmonary:      Effort: Pulmonary effort is normal.   Neurological:      Mental Status: She is alert and oriented to person, place, and time.      Sensory: Sensation is intact.      Motor: Motor function is intact.      Coordination: Coordination is intact.      Deep Tendon Reflexes: Reflexes are normal and symmetric.        Neurologic Exam     Mental Status   Oriented to person, place, and time.        Result Review :               Assessment and Plan    Diagnoses and all orders for this visit:    1. Intractable " chronic migraine without aura and without status migrainosus (Primary)  Assessment & Plan:  Will start Emgality for preventative therapy and Ubrelvy PRN for abortive therapy.  Due to strong family history of brain cancer, her age, and significance of symptoms-- will order MRI brain for screening.     First dose of Emgality given in the office today with a sample to initiate treatment.       Orders:  -     MRI Brain With & Without Contrast; Future  -     Galcanezumab-gnlm solution prefilled syringe 240 mg    Other orders  -     galcanezumab-gnlm (Emgality) 120 MG/ML auto-injector pen; Inject 1 mL under the skin into the appropriate area as directed Every 30 (Thirty) Days.  Dispense: 1 each; Refill: 5  -     ubrogepant (Ubrelvy) 100 MG tablet; Take 1 tablet by mouth 1 (One) Time As Needed (migraine) for up to 30 doses. One tablet at HA onset, may repeat once in 2 hours if needed.  Dispense: 10 tablet; Refill: 5      I spent 45 minutes caring for Josefina on this date of service. This time includes time spent by me in the following activities:preparing for the visit, reviewing tests, obtaining and/or reviewing a separately obtained history, performing a medically appropriate examination and/or evaluation , counseling and educating the patient/family/caregiver, ordering medications, tests, or procedures, documenting information in the medical record, and independently interpreting results and communicating that information with the patient/family/caregiver  Follow Up   Return in about 4 months (around 5/3/2024) for Migraine f/u.  Patient was given instructions and counseling regarding her condition or for health maintenance advice. Please see specific information pulled into the AVS if appropriate.

## 2024-01-03 NOTE — PATIENT INSTRUCTIONS
Migraine Headache  A migraine headache is an intense, throbbing pain on one side or both sides of the head. Migraine headaches may also cause other symptoms, such as nausea, vomiting, and sensitivity to light and noise. A migraine headache can last from 4 hours to 3 days. Talk with your doctor about what things may bring on (trigger) your migraine headaches.  What are the causes?  The exact cause of this condition is not known. However, a migraine may be caused when nerves in the brain become irritated and release chemicals that cause inflammation of blood vessels. This inflammation causes pain. This condition may be triggered or caused by:  Drinking alcohol.  Smoking.  Taking medicines, such as:  Medicine used to treat chest pain (nitroglycerin).  Birth control pills.  Estrogen.  Certain blood pressure medicines.  Eating or drinking products that contain nitrates, glutamate, aspartame, or tyramine. Aged cheeses, chocolate, or caffeine may also be triggers.  Doing physical activity.  Other things that may trigger a migraine headache include:  Menstruation.  Pregnancy.  Hunger.  Stress.  Lack of sleep or too much sleep.  Weather changes.  Fatigue.  What increases the risk?  The following factors may make you more likely to experience migraine headaches:  Being a certain age. This condition is more common in people who are 25-55 years old.  Being female.  Having a family history of migraine headaches.  Being .  Having a mental health condition, such as depression or anxiety.  Being obese.  What are the signs or symptoms?  The main symptom of this condition is pulsating or throbbing pain. This pain may:  Happen in any area of the head, such as on one side or both sides.  Interfere with daily activities.  Get worse with physical activity.  Get worse with exposure to bright lights or loud noises.  Other symptoms may include:  Nausea.  Vomiting.  Dizziness.  General sensitivity to bright lights, loud noises, or  smells.  Before you get a migraine headache, you may get warning signs (an aura). An aura may include:  Seeing flashing lights or having blind spots.  Seeing bright spots, halos, or zigzag lines.  Having tunnel vision or blurred vision.  Having numbness or a tingling feeling.  Having trouble talking.  Having muscle weakness.  Some people have symptoms after a migraine headache (postdromal phase), such as:  Feeling tired.  Difficulty concentrating.  How is this diagnosed?  A migraine headache can be diagnosed based on:  Your symptoms.  A physical exam.  Tests, such as:  CT scan or an MRI of the head. These imaging tests can help rule out other causes of headaches.  Taking fluid from the spine (lumbar puncture) and analyzing it (cerebrospinal fluid analysis, or CSF analysis).  How is this treated?  This condition may be treated with medicines that:  Relieve pain.  Relieve nausea.  Prevent migraine headaches.  Treatment for this condition may also include:  Acupuncture.  Lifestyle changes like avoiding foods that trigger migraine headaches.  Biofeedback.  Cognitive behavioral therapy.  Follow these instructions at home:  Medicines  Take over-the-counter and prescription medicines only as told by your health care provider.  Ask your health care provider if the medicine prescribed to you:  Requires you to avoid driving or using heavy machinery.  Can cause constipation. You may need to take these actions to prevent or treat constipation:  Drink enough fluid to keep your urine pale yellow.  Take over-the-counter or prescription medicines.  Eat foods that are high in fiber, such as beans, whole grains, and fresh fruits and vegetables.  Limit foods that are high in fat and processed sugars, such as fried or sweet foods.  Lifestyle  Do not drink alcohol.  Do not use any products that contain nicotine or tobacco, such as cigarettes, e-cigarettes, and chewing tobacco. If you need help quitting, ask your health care  provider.  Get at least 8 hours of sleep every night.  Find ways to manage stress, such as meditation, deep breathing, or yoga.  General instructions  Keep a journal to find out what may trigger your migraine headaches. For example, write down:  What you eat and drink.  How much sleep you get.  Any change to your diet or medicines.  If you have a migraine headache:  Avoid things that make your symptoms worse, such as bright lights.  It may help to lie down in a dark, quiet room.  Do not drive or use heavy machinery.  Ask your health care provider what activities are safe for you while you are experiencing symptoms.  Keep all follow-up visits as told by your health care provider. This is important.  Contact a health care provider if:  You develop symptoms that are different or more severe than your usual migraine headache symptoms.  You have more than 15 headache days in one month.  Get help right away if:  Your migraine headache becomes severe.  Your migraine headache lasts longer than 72 hours.  You have a fever.  You have a stiff neck.  You have vision loss.  Your muscles feel weak or like you cannot control them.  You start to lose your balance often.  You have trouble walking.  You faint.  You have a seizure.  Summary  A migraine headache is an intense, throbbing pain on one side or both sides of the head. Migraines may also cause other symptoms, such as nausea, vomiting, and sensitivity to light and noise.  This condition may be treated with medicines and lifestyle changes. You may also need to avoid certain things that trigger a migraine headache.  Keep a journal to find out what may trigger your migraine headaches.  Contact your health care provider if you have more than 15 headache days in a month or you develop symptoms that are different or more severe than your usual migraine headache symptoms.  This information is not intended to replace advice given to you by your health care provider. Make sure you  discuss any questions you have with your health care provider.  Document Revised: 06/01/2023 Document Reviewed: 01/30/2020  Elsevier Patient Education © 2023 [employer]     Pt. with ALICJA in the setting of elevated lithium level, sepsis, nausea, vomiting, diarrhea, and decreased PO intake. On review of [place], Scr was WNL at 1.02 on 5/26/23. Scr was elevated to 3.81 on ER labs performed on 7/24. Pt. received IVF. Scr remains elevated at 3.60 today. UOP is not documented. UA shows proteinuria, evidence of infection, ?178 casts, 8 RBCs, and 22 epithelial cells. No renal imaging available for review. Pt. with lithium induced ALICJA vs pre-renal ALICJA vs ATN. Plan for urgent HD given persistently elevated lithium levels. HD consent was obtained from pt's daughter over the phone and placed in chart. Continue with IVFs. Repeat UA. Obtain renal US. Monitor labs and urine output. Avoid nephrotoxins. Dose medications as per eGFR. Pt. with ALICJA in the setting of elevated lithium level, sepsis, nausea, vomiting, diarrhea, and decreased PO intake. On review of [place], Scr was WNL at 1.02 on 5/26/23. Scr was elevated to 3.81 on ER labs performed on 7/24. Pt. received IVF. Scr remains elevated at 3.60 today. UOP is not documented. UA shows proteinuria, evidence of infection, ?178 casts, 8 RBCs, and 22 epithelial cells. No renal imaging available for review. Toxicology consult note was reviewed. Pt. with AMS. Labs reviewed. Pt. with lithium induced ALICJA vs pre-renal ALICJA vs ATN. Plan for urgent HD given persistently elevated lithium levels. HD consent was obtained from pt's daughter over the phone and placed in chart. Continue with IVFs. Repeat UA. Obtain renal US. Monitor labs and urine output. Avoid nephrotoxins. Dose medications as per eGFR.

## 2024-01-05 ENCOUNTER — PRIOR AUTHORIZATION (OUTPATIENT)
Dept: NEUROLOGY | Facility: CLINIC | Age: 54
End: 2024-01-05
Payer: MEDICAID

## 2024-01-05 NOTE — ASSESSMENT & PLAN NOTE
Will start Emgality for preventative therapy and Ubrelvy PRN for abortive therapy.  Due to strong family history of brain cancer, her age, and significance of symptoms-- will order MRI brain for screening.     First dose of Emgality given in the office today with a sample to initiate treatment.

## 2024-01-05 NOTE — TELEPHONE ENCOUNTER
Ubrelvy PA initiated via [facility]  Approved today  The request has been approved. The authorization is effective from 01/05/2024 to 04/04/2024, as long as the member is enrolled in their current health plan. The request was approved as submitted. A written notification letter will follow with additional details.

## 2024-01-05 NOTE — TELEPHONE ENCOUNTER
The request has been approved. The authorization is effective from 01/05/2024 to 04/04/2024, as long as the member is enrolled in their current health plan. The request was approved as submitted. A written notification letter will follow with additional details.

## 2024-01-15 ENCOUNTER — OFFICE VISIT (OUTPATIENT)
Dept: FAMILY MEDICINE CLINIC | Age: 54
End: 2024-01-15
Payer: MEDICAID

## 2024-01-15 VITALS
HEIGHT: 65 IN | HEART RATE: 90 BPM | TEMPERATURE: 98.2 F | DIASTOLIC BLOOD PRESSURE: 84 MMHG | BODY MASS INDEX: 19.66 KG/M2 | SYSTOLIC BLOOD PRESSURE: 128 MMHG | OXYGEN SATURATION: 99 % | WEIGHT: 118 LBS

## 2024-01-15 DIAGNOSIS — U07.1 COVID-19: Primary | ICD-10-CM

## 2024-01-15 PROCEDURE — 99213 OFFICE O/P EST LOW 20 MIN: CPT | Performed by: NURSE PRACTITIONER

## 2024-01-15 NOTE — PROGRESS NOTES
"Chief Complaint  Josefina Nunez presents to [facility] for Nasal Congestion (Pt Sx with covid last week still having Sx /X 6 days ), Headache (X 6 days ), Nose Bleed, and Hoarse (X 6 days )      Subjective     History of Present Illness  She was diagnosed with covid 6 days ago fast pace - no paxlovid , and is still having congestion. She is not coughing up much No shortness of breath.  Nose bleeding over the past 2 days. She does continue to have HA . She is supposed to return to work but does not feel like returning at this time.  She has been taking OTC antihistamines with no benefit    Assessment and Plan       Diagnoses and all orders for this visit:    1. COVID-19 (Primary)  Comments:  advise of mucinex, saline nasal rinses and ok to return to work on Thursday - follow up if symptoms worsening            Follow Up   Return if symptoms worsen or fail to improve.  Future Appointments   Date Time Provider Department Center   1/26/2024  1:00 PM [facility] 1 [facility]   3/25/2024  3:00 PM Amara Garcia APRN [facility]   5/14/2024  2:30 PM Kelle Lozano APRKIRSTIN [facility]       No orders of the defined types were placed in this encounter.      Medications Discontinued During This Encounter   Medication Reason    Rimegepant Sulfate (Nurtec) 75 MG tablet dispersible tablet *Therapy completed          Review of Systems    Objective     Vitals:    01/15/24 1654   BP: 128/84   BP Location: Left arm   Patient Position: Sitting   Cuff Size: Adult   Pulse: 90   Temp: 98.2 °F (36.8 °C)   TempSrc: Oral   SpO2: 99%   Weight: 53.5 kg (118 lb)   Height: 165.1 cm (65\")     Body mass index is 19.64 kg/m².   BMI is within normal parameters. No other follow-up for BMI required.      Physical Exam  Constitutional:       General: She is not in acute distress.     Appearance: Normal appearance. She is ill-appearing.   HENT:      Head: Normocephalic.   Cardiovascular:    "   Rate and Rhythm: Normal rate and regular rhythm.   Pulmonary:      Effort: Pulmonary effort is normal.      Breath sounds: Normal breath sounds.   Musculoskeletal:         General: Normal range of motion.   Neurological:      General: No focal deficit present.      Mental Status: She is alert and oriented to person, place, and time.   Psychiatric:         Mood and Affect: Mood normal.         Behavior: Behavior normal.            Result Review               Allergies   Allergen Reactions    Codeine Unknown - High Severity    Morphine Itching    Sulfa Antibiotics Unknown - High Severity and Other (See Comments)    Sumatriptan Unknown - High Severity      Past Medical History:   Diagnosis Date    ADHD (attention deficit hyperactivity disorder) 2021    symptoms have progressively worsened in the past 2 years    Allergic     Arthritis 2020    Calculus of kidney     Depression 2021    Anxiety and depression have progressively  become much  worse since the onset of hyperparathyroidism and/or osteoporosis symptoms.    Fibroid Uterine    Generalized anxiety disorder     History of medical problems 2021    Sporatic multiple-gland disease. Hyperparathyroidism with 3 and a half of the 4  parathyroid glands removed in 2022. Since this condition started, I've had chronic fatigue, chronic muscle/bone pain and worsening insomnia.    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 I've had at least 6 noninjury bone fractures documented in various  radiology tests/procedures in 2021 and 2022.    Urinary tract infection      Current Outpatient Medications   Medication Sig Dispense Refill    albuterol sulfate  (90 Base) MCG/ACT inhaler Inhale 2 puffs Every 4 (Four) Hours As Needed.      alendronate (FOSAMAX) 70 MG tablet Take 1 tablet by mouth 1 (One) Time Per Week. 12 tablet 3    amphetamine-dextroamphetamine (ADDERALL) 20 MG tablet Take 1 tablet by mouth 2 (Two) Times a Day.      Calcium Carbonate-Vitamin D (calcium-vitamin D) 500-200 MG-UNIT tablet per tablet Take 1 tablet by mouth Daily.      cyclobenzaprine (FLEXERIL) 10 MG tablet Take 0.5-1 tablets by mouth At Night As Needed for Muscle Spasms. 20 tablet 0    diazePAM (VALIUM) 5 MG tablet Take 1 tablet by mouth At Night As Needed.      dicyclomine (BENTYL) 10 MG capsule Take 1 capsule by mouth 4 (Four) Times a Day Before Meals & at Bedtime As Needed for Abdominal Cramping. 30 capsule 1    EPINEPHrine (EPIPEN) 0.3 MG/0.3ML solution auto-injector injection Inject 0.3 mL into the appropriate muscle as directed by prescriber 1 (One) Time.      estradiol-levonorgestrel (CLIMARAPRO) 0.045-0.015 MG/DAY Place 1 patch on the skin as directed by provider 1 (One) Time Per Week.      fluticasone (FLONASE) 50 MCG/ACT nasal spray 2 sprays As Needed.      galcanezumab-gnlm (Emgality) 120 MG/ML auto-injector pen Inject 1 mL under the skin into the appropriate area as directed Every 30 (Thirty) Days. 1 each 5    hydrOXYzine pamoate (VISTARIL) 50 MG capsule Take 1 capsule by mouth As Needed.      mirtazapine (REMERON) 15 MG tablet Take 1 tablet by mouth Daily With Dinner.      montelukast (Singulair) 10 MG tablet Take 1 tablet by mouth Every Night. 90 tablet 3    Symbicort 80-4.5 MCG/ACT inhaler Inhale 2 puffs As Needed.      ubrogepant (Ubrelvy) 100 MG tablet Take 1 tablet by mouth 1 (One) Time As Needed (migraine) for up to 30 doses. One tablet at HA onset, may repeat once in 2 hours if needed. 10 tablet 5     olopatadine (PATANOL) 0.1 % ophthalmic solution Administer 1 drop to both eyes As Needed. (Patient not taking: Reported on 1/15/2024)      Probiotic Product capsule Take  by mouth. (Patient not taking: Reported on 1/3/2024)       No current facility-administered medications for this visit.     Past Surgical History:   Procedure Laterality Date    COLONOSCOPY  2020    CYSTOSCOPY  2013- Dr Lockhart    ENDOMETRIAL ABLATION  2011    KIDNEY STONE SURGERY  11/2014 stone removal and stent placement    LAPAROSCOPIC CHOLECYSTECTOMY      LASER ABLATION      UTERINE    TUBAL ABDOMINAL LIGATION Bilateral       Health Maintenance Due   Topic Date Due    TDAP/TD VACCINES (1 - Tdap) Never done      Immunization History   Administered Date(s) Administered    COVID-19 (UNSPECIFIED) 12/01/2021, 12/29/2021    Covid-19 (Pfizer) Gray Cap Monovalent 05/24/2022    PPD Test 02/11/2009, 02/11/2009         Part of this note may be an electronic transcription/translation of spoken language to printed   text using the Dragon Dictation System.      MIGDALIA Carlisle

## 2024-01-15 NOTE — LETTER
January 15, 2024     Patient: Josefina Nunez   YOB: 1970   Date of Visit: 1/15/2024       To Whom It May Concern:    It is my medical opinion that Josefina Nunez may return to work Thursday 1/18/2024.         Sincerely,        MIGDALIA Carlisle    CC: No Recipients

## 2024-02-05 DIAGNOSIS — M19.90 ARTHRITIS: ICD-10-CM

## 2024-02-06 RX ORDER — CYCLOBENZAPRINE HCL 10 MG
TABLET ORAL
Qty: 20 TABLET | Refills: 0 | Status: SHIPPED | OUTPATIENT
Start: 2024-02-06

## 2024-02-07 ENCOUNTER — HOSPITAL ENCOUNTER (OUTPATIENT)
Dept: MRI IMAGING | Facility: HOSPITAL | Age: 54
Discharge: HOME | End: 2024-02-07
Admitting: NURSE PRACTITIONER
Payer: MEDICAID

## 2024-02-07 DIAGNOSIS — G43.719 INTRACTABLE CHRONIC MIGRAINE WITHOUT AURA AND WITHOUT STATUS MIGRAINOSUS: ICD-10-CM

## 2024-02-07 PROCEDURE — A9577 INJ MULTIHANCE: HCPCS | Performed by: NURSE PRACTITIONER

## 2024-02-07 PROCEDURE — 70553 MRI BRAIN STEM W/O & W/DYE: CPT

## 2024-02-07 PROCEDURE — 0 GADOBENATE DIMEGLUMINE 529 MG/ML SOLUTION: Performed by: NURSE PRACTITIONER

## 2024-02-07 RX ADMIN — GADOBENATE DIMEGLUMINE 10 ML: 529 INJECTION, SOLUTION INTRAVENOUS at 15:49

## 2024-02-19 DIAGNOSIS — Z12.83 SCREENING EXAM FOR SKIN CANCER: Primary | ICD-10-CM

## 2024-03-25 ENCOUNTER — PRIOR AUTHORIZATION (OUTPATIENT)
Dept: NEUROLOGY | Facility: CLINIC | Age: 54
End: 2024-03-25
Payer: MEDICAID

## 2024-03-25 ENCOUNTER — OFFICE VISIT (OUTPATIENT)
Dept: FAMILY MEDICINE CLINIC | Age: 54
End: 2024-03-25
Payer: MEDICAID

## 2024-03-25 VITALS
HEIGHT: 65 IN | HEART RATE: 85 BPM | SYSTOLIC BLOOD PRESSURE: 139 MMHG | OXYGEN SATURATION: 100 % | BODY MASS INDEX: 19.26 KG/M2 | DIASTOLIC BLOOD PRESSURE: 88 MMHG | WEIGHT: 115.6 LBS | TEMPERATURE: 98.1 F

## 2024-03-25 DIAGNOSIS — J30.9 ALLERGIC RHINITIS, UNSPECIFIED SEASONALITY, UNSPECIFIED TRIGGER: ICD-10-CM

## 2024-03-25 DIAGNOSIS — J01.91 ACUTE RECURRENT SINUSITIS, UNSPECIFIED LOCATION: ICD-10-CM

## 2024-03-25 DIAGNOSIS — R05.1 ACUTE COUGH: ICD-10-CM

## 2024-03-25 DIAGNOSIS — Z00.00 ROUTINE GENERAL MEDICAL EXAMINATION AT A HEALTH CARE FACILITY: Primary | ICD-10-CM

## 2024-03-25 DIAGNOSIS — Z13.6 SCREENING FOR CARDIOVASCULAR CONDITION: ICD-10-CM

## 2024-03-25 RX ORDER — LEVOCETIRIZINE DIHYDROCHLORIDE 5 MG/1
5 TABLET, FILM COATED ORAL EVERY EVENING
Qty: 90 TABLET | Refills: 3 | Status: SHIPPED | OUTPATIENT
Start: 2024-03-25

## 2024-03-25 RX ORDER — AZITHROMYCIN 250 MG/1
TABLET, FILM COATED ORAL
Qty: 6 TABLET | Refills: 0 | Status: SHIPPED | OUTPATIENT
Start: 2024-03-25

## 2024-03-25 NOTE — PROGRESS NOTES
Chief Complaint  Josefina Nunez presents to [facility] for Annual Exam, Osteoporosis, Arthritis, Fever (Low grade fever on and off X 2 weeks ), Shortness of Breath (X 2 weeks on and off ), Nasal Congestion (Green nasal drainage X 2 weeks ), Cough (Coughing up green mucus X 2 weeks ), Sore Throat (X 2 weeks ), and Earache (Bilateral ear ache X 2 weeks )      Subjective     History of Present Illness  Josefina  here today for concerns of URI symptoms  or possible covid / flu.      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   Daily   Date of symptoms start?    2 weeks    (Symptoms may appear 2-14 days after exposure )    Fever or chills?  Yes, describe: 101  Cough?   yes  Shortness of breath or difficulty breathing?  yes  Fatigue?   no  Muscle or body aches?  no   Headache?   yes  New loss of taste or smell? no  Sore throat?  yes  Congestion or runny nose? yes  Nausea or vomiting?   no  Diarrhea?   no    Taking any medications at home to help with symptoms?Yes, describe: mucinex, OTC decongestants   Any prior vaccine to covid?   yes  Any prior vaccine to flu this season? no  Any significant health problems / existing lung / heart problems?  Yes, describe: allergies/asthma    Josefina is here today for annual exam.   Last annual exam was 1 year  Last eye exam: 2 years  [facility]  Last dental exam:   6 months    PROVIDER:   [facility]  Last menstrual period:  n/a ago.  Diet / exercise:   No special diet or specific exercise program  Patient's Body mass index is 19.24 kg/m². indicating that she is under weight (BMI < 20).    Patient Care Team:  Amara Garcia APRN as PCP - General (Nurse Practitioner)  Trae Levin MD as Consulting Physician (Endocrinology)  Lyn Lopez APRN (Nurse Practitioner)  Melvi Fitzgerald MD as Consulting Physician (Obstetrics and Gynecology)      Any  emergency  warning signs for COVID-19. No  Trouble breathing?    Persistent pain or pressure in the chest?    New confusion?   Inability to wake or stay awake?  Pale, gray, or blue-colored skin, lips, or nail beds, depending on skin tone?                   Assessment and Plan       Diagnoses and all orders for this visit:    1. Routine general medical examination at a health care facility (Primary)  Comments:  well balanced diet , annual wellness, health maintenance recommendations discussed    2. Allergic rhinitis, unspecified seasonality, unspecified trigger  Comments:  continue to follow upw ith allergist as recommended, discuss singulair use as advised, will try Rx of Xyxal to continue saline nasal sprays consistently  Orders:  -     levocetirizine (XYZAL) 5 MG tablet; Take 1 tablet by mouth Every Evening.  Dispense: 90 tablet; Refill: 3    3. Acute recurrent sinusitis, unspecified location  Comments:  will treat with antibiotic due to prolonged symptoms.  Orders:  -     azithromycin (Zithromax Z-Papi) 250 MG tablet; Take as directed  Dispense: 6 tablet; Refill: 0    4. Acute cough  Comments:  robitussin OTC PRN - if cough persists in 2-3 days, she will come get CXR otherwise, OK to cancel if not obtained within 1 week of symptoms  Orders:  -     XR Chest PA & Lateral; Future    5. Screening for cardiovascular condition  -     CBC No Differential; Future  -     Comprehensive metabolic panel; Future  -     Lipid panel; Future            Follow Up   Return in about 6 months (around 9/25/2024), or if symptoms worsen or fail to improve.  Future Appointments   Date Time Provider Department Center   5/14/2024  2:30 PM Kelle Lozano APRN [facility]   9/23/2024 11:45 AM Amara Garcia APRN [facility]       New Medications Ordered This Visit   Medications    levocetirizine (XYZAL) 5 MG tablet     Sig: Take 1 tablet by mouth Every Evening.     Dispense:  90 tablet     Refill:  3    azithromycin (Zithromax Z-Papi)  "250 MG tablet     Sig: Take as directed     Dispense:  6 tablet     Refill:  0       Medications Discontinued During This Encounter   Medication Reason    montelukast (Singulair) 10 MG tablet Non-compliance    fluticasone (FLONASE) 50 MCG/ACT nasal spray *Therapy completed    ubrogepant (Ubrelvy) 100 MG tablet Alternate therapy          Review of Systems    Objective     Vitals:    03/25/24 1512   BP: 139/88   BP Location: Right arm   Patient Position: Sitting   Cuff Size: Adult   Pulse: 85   Temp: 98.1 °F (36.7 °C)   TempSrc: Oral   SpO2: 100%   Weight: 52.4 kg (115 lb 9.6 oz)   Height: 165.1 cm (65\")     Body mass index is 19.24 kg/m².   BMI is within normal parameters. No other follow-up for BMI required.      Physical Exam  Constitutional:       General: She is not in acute distress.     Appearance: Normal appearance.   HENT:      Head: Normocephalic.      Right Ear: A middle ear effusion is present.      Left Ear: A middle ear effusion is present.      Nose:      Right Sinus: Frontal sinus tenderness present.      Left Sinus: Frontal sinus tenderness present.      Mouth/Throat:      Pharynx: No posterior oropharyngeal erythema.   Cardiovascular:      Rate and Rhythm: Normal rate and regular rhythm.   Pulmonary:      Effort: Pulmonary effort is normal.      Breath sounds: Normal breath sounds.   Musculoskeletal:         General: Normal range of motion.   Neurological:      General: No focal deficit present.      Mental Status: She is alert and oriented to person, place, and time.   Psychiatric:         Mood and Affect: Mood normal.         Behavior: Behavior normal.            Result Review               Allergies   Allergen Reactions    Codeine Unknown - High Severity    Morphine Itching    Sulfa Antibiotics Unknown - High Severity and Other (See Comments)    Sumatriptan Unknown - High Severity      Past Medical History:   Diagnosis Date    ADHD (attention deficit hyperactivity disorder) 2021    symptoms have " progressively worsened in the past 2 years    Allergic     Arthritis 2020    Calculus of kidney     Depression 2021    Anxiety and depression have progressively  become much  worse since the onset of hyperparathyroidism and/or osteoporosis symptoms.    Fibroid Uterine    Generalized anxiety disorder     History of medical problems 2021    Sporatic multiple-gland disease. Hyperparathyroidism with 3 and a half of the 4  parathyroid glands removed in 2022. Since this condition started, I've had chronic fatigue, chronic muscle/bone pain and worsening insomnia.    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 I've had at least 6 noninjury bone fractures documented in various radiology tests/procedures in 2021 and 2022.    Urinary tract infection      Current Outpatient Medications   Medication Sig Dispense Refill    albuterol sulfate  (90 Base) MCG/ACT inhaler Inhale 2 puffs Every 4 (Four) Hours As Needed.      alendronate (FOSAMAX) 70 MG tablet Take 1 tablet by mouth 1 (One) Time Per Week. 12 tablet 3    amphetamine-dextroamphetamine (ADDERALL) 20 MG tablet Take 1 tablet by mouth 2 (Two) Times a Day.      Calcium Carbonate-Vitamin D (calcium-vitamin D) 500-200 MG-UNIT tablet per tablet Take 1 tablet by mouth Daily.      cyclobenzaprine (FLEXERIL) 10 MG tablet TAKE 1/2 TO 1 (ONE-HALF TO ONE) TABLET BY MOUTH AT NIGHT AS NEEDED FOR MUSCLE SPASM 20 tablet 0    diazePAM (VALIUM) 5 MG tablet Take 1 tablet by mouth At Night As Needed.       EPINEPHrine (EPIPEN) 0.3 MG/0.3ML solution auto-injector injection Inject 0.3 mL into the appropriate muscle as directed by prescriber 1 (One) Time.      estradiol-levonorgestrel (CLIMARAPRO) 0.045-0.015 MG/DAY Place 1 patch on the skin as directed by provider 1 (One) Time Per Week.      galcanezumab-gnlm (Emgality) 120 MG/ML auto-injector pen Inject 1 mL under the skin into the appropriate area as directed Every 30 (Thirty) Days. 1 each 5    hydrOXYzine pamoate (VISTARIL) 50 MG capsule Take 1 capsule by mouth As Needed.      mirtazapine (REMERON) 15 MG tablet Take 1 tablet by mouth Daily With Dinner.      Symbicort 80-4.5 MCG/ACT inhaler Inhale 2 puffs As Needed.      azithromycin (Zithromax Z-Papi) 250 MG tablet Take as directed 6 tablet 0    dicyclomine (BENTYL) 10 MG capsule Take 1 capsule by mouth 4 (Four) Times a Day Before Meals & at Bedtime As Needed for Abdominal Cramping. (Patient not taking: Reported on 3/25/2024) 30 capsule 1    levocetirizine (XYZAL) 5 MG tablet Take 1 tablet by mouth Every Evening. 90 tablet 3     No current facility-administered medications for this visit.     Past Surgical History:   Procedure Laterality Date    COLONOSCOPY  2020    CYSTOSCOPY  2013- Dr Lockhart    ENDOMETRIAL ABLATION  2011    KIDNEY STONE SURGERY  11/2014 stone removal and stent placement    LAPAROSCOPIC CHOLECYSTECTOMY      LASER ABLATION      UTERINE    TUBAL ABDOMINAL LIGATION Bilateral       Health Maintenance Due   Topic Date Due    TDAP/TD VACCINES (1 - Tdap) Never done      Immunization History   Administered Date(s) Administered    COVID-19 (UNSPECIFIED) 12/01/2021, 12/29/2021    Covid-19 (Pfizer) Gray Cap Monovalent 05/24/2022    PPD Test 02/11/2009, 02/11/2009         Part of this note may be an electronic transcription/translation of spoken language to printed   text using the Dragon Dictation System.      MIGDALIA Carlisle

## 2024-03-25 NOTE — TELEPHONE ENCOUNTER
Ubrelvy PA has been renewed via CM:  Approved today  The request has been approved. The authorization is effective from 03/25/2024 to 03/25/2025, as long as the member is enrolled in their current health plan. A written notification letter will follow with additional details.  Authorization Expiration Date: 3/24/2025

## 2024-03-25 NOTE — TELEPHONE ENCOUNTER
Emgality PA has been renewed via CMM:  The request has been approved. The authorization is effective from 03/25/2024 to 03/25/2025, as long as the member is enrolled in their current health plan. A written notification letter will follow with additional details.. Authorization Expiration Date: March 25, 2025.

## 2024-04-02 DIAGNOSIS — M19.90 ARTHRITIS: ICD-10-CM

## 2024-04-03 RX ORDER — CYCLOBENZAPRINE HCL 10 MG
TABLET ORAL
Qty: 20 TABLET | Refills: 0 | Status: SHIPPED | OUTPATIENT
Start: 2024-04-03

## 2024-04-29 ENCOUNTER — TELEPHONE (OUTPATIENT)
Dept: FAMILY MEDICINE CLINIC | Age: 54
End: 2024-04-29
Payer: MEDICAID

## 2024-04-29 NOTE — TELEPHONE ENCOUNTER
1st attempt: called pt re overdue fasting labs ordered 3/25/24. States she will be in soon to complete.

## 2024-05-14 ENCOUNTER — OFFICE VISIT (OUTPATIENT)
Dept: NEUROLOGY | Facility: CLINIC | Age: 54
End: 2024-05-14
Payer: MEDICAID

## 2024-05-14 ENCOUNTER — LAB (OUTPATIENT)
Dept: LAB | Facility: HOSPITAL | Age: 54
End: 2024-05-14
Payer: MEDICAID

## 2024-05-14 ENCOUNTER — PRIOR AUTHORIZATION (OUTPATIENT)
Dept: NEUROLOGY | Facility: CLINIC | Age: 54
End: 2024-05-14

## 2024-05-14 VITALS
BODY MASS INDEX: 19.09 KG/M2 | DIASTOLIC BLOOD PRESSURE: 81 MMHG | HEIGHT: 65 IN | HEART RATE: 80 BPM | WEIGHT: 114.6 LBS | SYSTOLIC BLOOD PRESSURE: 125 MMHG

## 2024-05-14 DIAGNOSIS — G43.719 INTRACTABLE CHRONIC MIGRAINE WITHOUT AURA AND WITHOUT STATUS MIGRAINOSUS: Primary | ICD-10-CM

## 2024-05-14 DIAGNOSIS — Z13.6 SCREENING FOR CARDIOVASCULAR CONDITION: ICD-10-CM

## 2024-05-14 LAB
ALBUMIN SERPL-MCNC: 4.6 G/DL (ref 3.5–5.2)
ALBUMIN/GLOB SERPL: 1.8 G/DL
ALP SERPL-CCNC: 40 U/L (ref 39–117)
ALT SERPL W P-5'-P-CCNC: 22 U/L (ref 1–33)
ANION GAP SERPL CALCULATED.3IONS-SCNC: 12 MMOL/L (ref 5–15)
AST SERPL-CCNC: 29 U/L (ref 1–32)
BILIRUB SERPL-MCNC: 0.3 MG/DL (ref 0–1.2)
BUN SERPL-MCNC: 14 MG/DL (ref 6–20)
BUN/CREAT SERPL: 17.7 (ref 7–25)
CALCIUM SPEC-SCNC: 9.1 MG/DL (ref 8.6–10.5)
CHLORIDE SERPL-SCNC: 102 MMOL/L (ref 98–107)
CHOLEST SERPL-MCNC: 165 MG/DL (ref 0–200)
CO2 SERPL-SCNC: 23 MMOL/L (ref 22–29)
CREAT SERPL-MCNC: 0.79 MG/DL (ref 0.57–1)
DEPRECATED RDW RBC AUTO: 44.5 FL (ref 37–54)
EGFRCR SERPLBLD CKD-EPI 2021: 89.6 ML/MIN/1.73
ERYTHROCYTE [DISTWIDTH] IN BLOOD BY AUTOMATED COUNT: 13.7 % (ref 12.3–15.4)
GLOBULIN UR ELPH-MCNC: 2.5 GM/DL
GLUCOSE SERPL-MCNC: 97 MG/DL (ref 65–99)
HCT VFR BLD AUTO: 42 % (ref 34–46.6)
HDLC SERPL-MCNC: 68 MG/DL (ref 40–60)
HGB BLD-MCNC: 13.3 G/DL (ref 12–15.9)
LDLC SERPL CALC-MCNC: 80 MG/DL (ref 0–100)
LDLC/HDLC SERPL: 1.16 {RATIO}
MCH RBC QN AUTO: 27.9 PG (ref 26.6–33)
MCHC RBC AUTO-ENTMCNC: 31.7 G/DL (ref 31.5–35.7)
MCV RBC AUTO: 88.1 FL (ref 79–97)
PLATELET # BLD AUTO: 312 10*3/MM3 (ref 140–450)
PMV BLD AUTO: 8.8 FL (ref 6–12)
POTASSIUM SERPL-SCNC: 4.5 MMOL/L (ref 3.5–5.2)
PROT SERPL-MCNC: 7.1 G/DL (ref 6–8.5)
RBC # BLD AUTO: 4.77 10*6/MM3 (ref 3.77–5.28)
SODIUM SERPL-SCNC: 137 MMOL/L (ref 136–145)
TRIGL SERPL-MCNC: 90 MG/DL (ref 0–150)
VLDLC SERPL-MCNC: 17 MG/DL (ref 5–40)
WBC NRBC COR # BLD AUTO: 7.2 10*3/MM3 (ref 3.4–10.8)

## 2024-05-14 PROCEDURE — 80053 COMPREHEN METABOLIC PANEL: CPT

## 2024-05-14 PROCEDURE — 85027 COMPLETE CBC AUTOMATED: CPT

## 2024-05-14 PROCEDURE — 80061 LIPID PANEL: CPT

## 2024-05-14 PROCEDURE — 36415 COLL VENOUS BLD VENIPUNCTURE: CPT

## 2024-05-14 RX ORDER — FEXOFENADINE HCL 180 MG/1
180 TABLET ORAL
COMMUNITY

## 2024-05-14 NOTE — PROGRESS NOTES
"Chief Complaint  Migraine    Subjective          Josefina Nunez presents to [facility]  History of Present Illness  Following up for migraine.  Having 15+ headache days per month. Not much improvement with Emgality.  Using Ubrelvy PRN for abortive therapy.        Interval History:   She reports that she developed headaches many years ago. Since that time, her headaches have progressively worsened.   Currently, she reports headaches that are located temporal and occipital . She characterizes the headaches as 8/10 in severity, piercing and throbbing  in nature with associated photophobia, phonophobia, and nausea. She reports headaches last 8+ hours. She reports 20 headache days per month. She denies associated aura. She denies focal numbness, weakness, speech, and vision changes.   Triggers: denies any known triggers   Symptoms improved by: Dark quiet room and Sleep   She states she is sleeping well. Reports getting 7-8 hours of sleep per night. denies snoring. Reports unrefreshing sleep.   Prior prophylactic medications include: topiramate, mirtazapine, amitriptyline,  propranolol,   She  uses abortive therapy such as: imitrex, triptans contraindicated d/t risk for serotonin syndrome, nurtec,   Caffeine Use: 1 servings daily  Childbearing potential : postmenopausal   History of Kidney Stones: Yes  She denies a family history of cerebral aneurysm.            Objective   Vital Signs:   /81   Pulse 80   Ht 165.1 cm (65\")   Wt 52 kg (114 lb 9.6 oz)   BMI 19.07 kg/m²     Physical Exam  HENT:      Head: Normocephalic.   Pulmonary:      Effort: Pulmonary effort is normal.   Neurological:      Mental Status: She is alert and oriented to person, place, and time.      Sensory: Sensation is intact.      Motor: Motor function is intact.      Coordination: Coordination is intact.      Deep Tendon Reflexes: Reflexes are normal and symmetric.        Neurologic Exam     Mental " Status   Oriented to person, place, and time.        Result Review :               Results for orders placed or performed during the hospital encounter of 02/07/24   MRI Brain With & Without Contrast    Narrative    PROCEDURE: MRI BRAIN W WO CONTRAST     COMPARISON: None     INDICATIONS: WORSENING MIGRAINES, FAMILY HISTORY OF BRAIN CANCER     CONTRAST: 10ML  Multihance I.V.     TECHNIQUE: A variety of imaging planes and parameters were utilized for visualization of suspected   pathology.  Images were performed without and with gadolinium contrast.     FINDINGS:   Diffusion-weighted imaging demonstrates no acute restriction abnormality.     The midline structures of the brain are grossly unremarkable in appearance.  Pituitary and sellar   structures are unremarkable.  Craniocervical junction demonstrates no acute abnormality.  The   ventricles, cisterns and sulci appear within normal limits.  No suspicious extra-axial fluid   collections are noted.  There is no acute intracranial hemorrhage or mass effect.  The gray and   white matter signal characteristics are within normal limits.  No abnormal areas of enhancement   noted within the brain parenchyma.  The major intracranial flow voids are patent.  Cerebral pontine   angle structures and inner ear structures are unremarkable.  The mastoid air cells are grossly   clear.  Minimal mucosal thickening noted of the paranasal sinuses without air-fluid levels.    Limited imaging of the orbits is grossly unremarkable in appearance       Impression       1. No acute intracranial abnormality  2. No abnormal postcontrast enhancement               SINAI REYES MD         Electronically Signed and Approved By: SINAI REYES MD on 2/08/2024 at 7:41                        Assessment and Plan    Diagnoses and all orders for this visit:    1. Intractable chronic migraine without aura and without status migrainosus (Primary)  Assessment & Plan:  No improvement with Emgality.   Will d/c and start botox for preventative therapy of migraine.  Ubrelvy PRN for abortive therapy.             Other orders  -     ubrogepant (Ubrelvy) 100 MG tablet; Take 1 tablet by mouth 1 (One) Time As Needed (migraine) for up to 30 doses. One tablet at HA onset, may repeat once in 2 hours if needed.  Dispense: 10 tablet; Refill: 5        Follow Up   No follow-ups on file.  Patient was given instructions and counseling regarding her condition or for health maintenance advice. Please see specific information pulled into the AVS if appropriate.

## 2024-05-17 NOTE — ASSESSMENT & PLAN NOTE
No improvement with Emgality.  Will d/c and start botox for preventative therapy of migraine.  Ubrelvy PRN for abortive therapy.         
[FreeTextEntry3] : A KUB x-ray is ordered to see if the stone is loosened or opaque. Urine is sent today. She will follow up with cystourethroscopy and further evaluation.

## 2024-05-21 ENCOUNTER — PATIENT MESSAGE (OUTPATIENT)
Dept: NEUROLOGY | Facility: CLINIC | Age: 54
End: 2024-05-21
Payer: MEDICAID

## 2024-06-14 ENCOUNTER — PROCEDURE VISIT (OUTPATIENT)
Dept: NEUROLOGY | Facility: CLINIC | Age: 54
End: 2024-06-14
Payer: MEDICAID

## 2024-06-14 DIAGNOSIS — G43.719 INTRACTABLE CHRONIC MIGRAINE WITHOUT AURA AND WITHOUT STATUS MIGRAINOSUS: Primary | ICD-10-CM

## 2024-06-14 PROCEDURE — 64615 CHEMODENERV MUSC MIGRAINE: CPT | Performed by: NURSE PRACTITIONER

## 2024-06-24 ENCOUNTER — TELEPHONE (OUTPATIENT)
Dept: NEUROLOGY | Facility: CLINIC | Age: 54
End: 2024-06-24
Payer: MEDICAID

## 2024-06-24 NOTE — TELEPHONE ENCOUNTER
Called patient LVM, [place] come in to be evaluated for her concerns due to her recent botox injections.

## 2024-06-25 ENCOUNTER — OFFICE VISIT (OUTPATIENT)
Dept: NEUROLOGY | Facility: CLINIC | Age: 54
End: 2024-06-25
Payer: MEDICAID

## 2024-06-25 VITALS
DIASTOLIC BLOOD PRESSURE: 92 MMHG | BODY MASS INDEX: 19.49 KG/M2 | SYSTOLIC BLOOD PRESSURE: 146 MMHG | HEIGHT: 65 IN | HEART RATE: 86 BPM | WEIGHT: 117 LBS

## 2024-06-25 DIAGNOSIS — G43.719 INTRACTABLE CHRONIC MIGRAINE WITHOUT AURA AND WITHOUT STATUS MIGRAINOSUS: Primary | ICD-10-CM

## 2024-06-25 DIAGNOSIS — M54.2 NECK PAIN: ICD-10-CM

## 2024-06-25 RX ORDER — TIZANIDINE 2 MG/1
2 TABLET ORAL
Qty: 30 TABLET | Refills: 0 | Status: SHIPPED | OUTPATIENT
Start: 2024-06-25

## 2024-06-25 NOTE — PROGRESS NOTES
"Chief Complaint  Neurologic Problem    Subjective          Josefina Nunez presents to [facility]  History of Present Illness  Had Botox for migraine on 6/14/24.  Reports onset of neck pain worse with movement since.  Neck pain is low neck between shoulder blades.  Radiates to occipital region.       Objective   Vital Signs:   /92   Pulse 86   Ht 165.1 cm (65\")   Wt 53.1 kg (117 lb)   BMI 19.47 kg/m²     Physical Exam  HENT:      Head: Normocephalic.   Pulmonary:      Effort: Pulmonary effort is normal.   Musculoskeletal:      Cervical back: Muscular tenderness present. Decreased range of motion.   Neurological:      Mental Status: She is alert and oriented to person, place, and time.      Sensory: Sensation is intact.      Motor: Motor function is intact.      Coordination: Coordination is intact.      Deep Tendon Reflexes: Reflexes are normal and symmetric.        Neurologic Exam     Mental Status   Oriented to person, place, and time.        Result Review :               Assessment and Plan    Diagnoses and all orders for this visit:    1. Intractable chronic migraine without aura and without status migrainosus (Primary)    2. Neck pain  Assessment & Plan:  Does not appear to be botox related.  Is not focused in any of the botox affected muscles. Will give diclofenac and muscle relaxant.  If not improved in 2 weeks, she will notify the office.       Other orders  -     tiZANidine (ZANAFLEX) 2 MG tablet; Take 1 tablet by mouth every night at bedtime.  Dispense: 30 tablet; Refill: 0  -     diclofenac (VOLTAREN) 50 MG EC tablet; Take 1 tablet by mouth 2 (Two) Times a Day.  Dispense: 30 tablet; Refill: 0        Follow Up   No follow-ups on file.  Patient was given instructions and counseling regarding her condition or for health maintenance advice. Please see specific information pulled into the AVS if appropriate.       "

## 2024-06-28 PROBLEM — M54.2 NECK PAIN: Status: ACTIVE | Noted: 2024-06-28

## 2024-06-28 NOTE — ASSESSMENT & PLAN NOTE
Does not appear to be botox related.  Is not focused in any of the botox affected muscles. Will give diclofenac and muscle relaxant.  If not improved in 2 weeks, she will notify the office.

## 2024-08-15 ENCOUNTER — HOSPITAL ENCOUNTER (OUTPATIENT)
Dept: GENERAL RADIOLOGY | Facility: HOSPITAL | Age: 54
Discharge: HOME | End: 2024-08-15
Admitting: NURSE PRACTITIONER
Payer: MEDICAID

## 2024-08-15 ENCOUNTER — OFFICE VISIT (OUTPATIENT)
Dept: FAMILY MEDICINE CLINIC | Age: 54
End: 2024-08-15
Payer: MEDICAID

## 2024-08-15 ENCOUNTER — TELEMEDICINE (OUTPATIENT)
Dept: NEUROLOGY | Facility: CLINIC | Age: 54
End: 2024-08-15
Payer: MEDICAID

## 2024-08-15 VITALS
WEIGHT: 115 LBS | HEART RATE: 88 BPM | HEIGHT: 65 IN | DIASTOLIC BLOOD PRESSURE: 83 MMHG | TEMPERATURE: 98 F | SYSTOLIC BLOOD PRESSURE: 126 MMHG | BODY MASS INDEX: 19.16 KG/M2 | OXYGEN SATURATION: 99 %

## 2024-08-15 DIAGNOSIS — K59.00 CONSTIPATION, UNSPECIFIED CONSTIPATION TYPE: Primary | ICD-10-CM

## 2024-08-15 DIAGNOSIS — G43.719 INTRACTABLE CHRONIC MIGRAINE WITHOUT AURA AND WITHOUT STATUS MIGRAINOSUS: Primary | ICD-10-CM

## 2024-08-15 DIAGNOSIS — R10.32 LLQ ABDOMINAL PAIN: ICD-10-CM

## 2024-08-15 DIAGNOSIS — K59.00 CONSTIPATION, UNSPECIFIED CONSTIPATION TYPE: ICD-10-CM

## 2024-08-15 PROCEDURE — 99213 OFFICE O/P EST LOW 20 MIN: CPT | Performed by: NURSE PRACTITIONER

## 2024-08-15 PROCEDURE — 74018 RADEX ABDOMEN 1 VIEW: CPT

## 2024-08-15 RX ORDER — ZAVEGEPANT 10 MG/.1ML
1 SPRAY NASAL DAILY PRN
Qty: 6 EACH | Refills: 5 | Status: SHIPPED | OUTPATIENT
Start: 2024-08-15

## 2024-08-15 NOTE — PROGRESS NOTES
Chief Complaint  Josefina Nunez presents to [facility] for Abdominal Cramping (Pt states bowel movements are never consistent. Bowel movements fluctuate from diarrhea to constipation. Sx worsened the last 6 months. )      Subjective     History of Present Illness  Sunita is in for problems with her bowels and abdomen.  She does have a history of cholecystectomy gastritis GERD.  She does have constipation which has been an ongoing problem and reports that when she takes something for her bowels it turns into water.  She has been trying OTC - enema, linzess PRN, MiraLAX as needed she will have nausea and cramping with every meal.  She does have a lot of bloating and has tried beano.  We did try bentyl to help and she states that this has not been helpful.  She is lactose intolerant and takes lactaid when necessary and tries to avoid all cheeses but does occasionally drink milk.  She reports that she had a colonoscopy about 4 -5 years ago which was completely normal.  She reports that she will occasionally have left lower quadrant abdominal discomfort.  Denies blood in her stool.        Assessment and Plan       Diagnoses and all orders for this visit:    1. Constipation, unspecified constipation type (Primary)  Comments:  Advised to take something on a regular basis given her history of longstanding constipation suspect irritable bowel with constipation, low FODMAP diet  Orders:  -     XR Abdomen KUB; Future    2. LLQ abdominal pain  Comments:  No red flags on exam today suspect from constipation excess gas            Follow Up   Return if symptoms worsen or fail to improve, for Pending imaging results.  Future Appointments   Date Time Provider Department Center   8/15/2024  2:45 PM Kelle oLzano APRN [facility]AIL[facility]   9/23/2024 11:45 AM Amara Garcia APRN [facility] BARDS [facility]       No orders of the defined types were placed in this encounter.      Medications Discontinued  "During This Encounter   Medication Reason    azithromycin (Zithromax Z-Papi) 250 MG tablet *Therapy completed    dicyclomine (BENTYL) 10 MG capsule *Therapy completed    diclofenac (VOLTAREN) 50 MG EC tablet *Therapy completed    galcanezumab-gnlm (Emgality) 120 MG/ML auto-injector pen Not Efficacious    hydrOXYzine pamoate (VISTARIL) 50 MG capsule Non-compliance    tiZANidine (ZANAFLEX) 2 MG tablet *Therapy completed    ubrogepant (Ubrelvy) 100 MG tablet Not Efficacious          Review of Systems    Objective     Vitals:    08/15/24 1351   BP: 126/83   Pulse: 88   Temp: 98 °F (36.7 °C)   TempSrc: Oral   SpO2: 99%   Weight: 52.2 kg (115 lb)   Height: 165.1 cm (65\")     Body mass index is 19.14 kg/m².   BMI is within normal parameters. No other follow-up for BMI required.      Physical Exam  Constitutional:       General: She is not in acute distress.     Appearance: Normal appearance.   HENT:      Head: Normocephalic.   Cardiovascular:      Rate and Rhythm: Normal rate and regular rhythm.   Pulmonary:      Effort: Pulmonary effort is normal.      Breath sounds: Normal breath sounds.   Abdominal:      Tenderness: There is abdominal tenderness in the left lower quadrant.   Musculoskeletal:         General: Normal range of motion.   Neurological:      General: No focal deficit present.      Mental Status: She is alert and oriented to person, place, and time.   Psychiatric:         Mood and Affect: Mood normal.         Behavior: Behavior normal.            Result Review               Allergies   Allergen Reactions    Codeine Unknown - High Severity    Morphine Itching    Sulfa Antibiotics Unknown - High Severity and Other (See Comments)    Sumatriptan Unknown - High Severity      Past Medical History:   Diagnosis Date    ADHD (attention deficit hyperactivity disorder) 2021    symptoms have progressively worsened in the past 2 years    Allergic     Arthritis 2020    Calculus of kidney     Depression 2021    Anxiety and " depression have progressively  become much  worse since the onset of hyperparathyroidism and/or osteoporosis symptoms.    Fibroid Uterine    Generalized anxiety disorder     History of medical problems 2021    Sporatic multiple-gland disease. Hyperparathyroidism with 3 and a half of the 4  parathyroid glands removed in 2022. Since this condition started, I've had chronic fatigue, chronic muscle/bone pain and worsening insomnia.    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 I've had at least 6 noninjury bone fractures documented in various radiology tests/procedures in 2021 and 2022.    Urinary tract infection      Current Outpatient Medications   Medication Sig Dispense Refill    albuterol sulfate  (90 Base) MCG/ACT inhaler Inhale 2 puffs Every 4 (Four) Hours As Needed.      alendronate (FOSAMAX) 70 MG tablet Take 1 tablet by mouth 1 (One) Time Per Week. 12 tablet 3    amphetamine-dextroamphetamine (ADDERALL) 20 MG tablet Take 1 tablet by mouth 2 (Two) Times a Day.      Calcium Carbonate-Vitamin D (calcium-vitamin D) 500-200 MG-UNIT tablet per tablet Take 1 tablet by mouth Daily.      cyclobenzaprine (FLEXERIL) 10 MG tablet TAKE 1/2 TO 1 (ONE-HALF TO ONE) TABLET BY MOUTH AT BEDTIME AS NEEDED FOR MUSCLE SPASM 20 tablet 0    diazePAM (VALIUM) 5 MG tablet Take 1 tablet by mouth At Night As Needed.      EPINEPHrine (EPIPEN) 0.3 MG/0.3ML solution auto-injector injection Inject 0.3 mL into the appropriate muscle as directed by  prescriber 1 (One) Time.      estradiol-levonorgestrel (CLIMARAPRO) 0.045-0.015 MG/DAY Place 1 patch on the skin as directed by provider 1 (One) Time Per Week.      fexofenadine (ALLEGRA) 180 MG tablet Take 1 tablet by mouth.      levocetirizine (XYZAL) 5 MG tablet Take 1 tablet by mouth Every Evening. 90 tablet 3    mirtazapine (REMERON) 15 MG tablet Take 1 tablet by mouth Daily With Dinner.      Symbicort 80-4.5 MCG/ACT inhaler Inhale 2 puffs As Needed.       No current facility-administered medications for this visit.     Past Surgical History:   Procedure Laterality Date    COLONOSCOPY  2020    CYSTOSCOPY  2013- Dr Lockhart    ENDOMETRIAL ABLATION  2011    KIDNEY STONE SURGERY  11/2014 stone removal and stent placement    LAPAROSCOPIC CHOLECYSTECTOMY      LASER ABLATION      UTERINE    TUBAL ABDOMINAL LIGATION Bilateral       There are no preventive care reminders to display for this patient.   Immunization History   Administered Date(s) Administered    COVID-19 (MODERNA) 1st,2nd,3rd Dose Monovalent 12/01/2021, 12/29/2021    COVID-19 (UNSPECIFIED) 12/01/2021, 12/29/2021    Covid-19 (Pfizer) Gray Cap Monovalent 05/24/2022    PPD Test 02/11/2009, 02/11/2009         Part of this note may be an electronic transcription/translation of spoken language to printed   text using the Dragon Dictation System.      MIGDALIA Carlisle

## 2024-08-15 NOTE — PROGRESS NOTES
Chief Complaint  Migraine    Subjective          Josefina Nunez presents to [facility]  History of Present Illness    History of Present Illness  The patient presents for a follow-up of migraines.    She last received Botox treatment on 06/14/2024. Following the treatment, she experienced acute neck pain, which was evaluated in the office on 06/25/2024. Initially, she noticed a slight improvement in her headaches, but they have since returned. The Botox treatment seems to have worn off, and she currently experiences headaches more than 50 percent of the time.    She perceives that Ubrelvy was not beneficial for abortive therapy and has also previously tried Nurtec. Despite these challenges, she is eager to proceed with another Botox treatment, as she perceives some benefits from it. Her next Botox treatment is scheduled for 09/13/2024.       Objective   Vital Signs:   There were no vitals taken for this visit.    Physical Exam   Neurologic Exam     Result Review :               Assessment and Plan    Diagnoses and all orders for this visit:    1. Intractable chronic migraine without aura and without status migrainosus (Primary)    Other orders  -     Zavegepant HCl (Zavzpret) 10 MG/ACT solution; 1 spray into the nostril(s) as directed by provider Daily As Needed (migraine).  Dispense: 6 each; Refill: 5        Assessment & Plan  1. Migraine.  Headaches not yet at goal. I will send in Zavzpret for her to take as needed for abortive therapy. Next Botox treatment will be on 09/13/2024.         Follow Up   Return in 29 days (on 9/13/2024) for Botox .  Patient was given instructions and counseling regarding her condition or for health maintenance advice. Please see specific information pulled into the AVS if appropriate.

## 2024-08-16 ENCOUNTER — PRIOR AUTHORIZATION (OUTPATIENT)
Dept: NEUROLOGY | Facility: CLINIC | Age: 54
End: 2024-08-16
Payer: MEDICAID

## 2024-08-16 NOTE — TELEPHONE ENCOUNTER
PA submitted through CM  Approved today by [place] [employer] 2017  The request has been approved. The authorization is effective from 08/16/2024 to 08/16/2025, as long as the member is enrolled in their current health plan. A written notification letter will follow with additional details.

## 2024-08-19 DIAGNOSIS — R10.32 LLQ ABDOMINAL PAIN: ICD-10-CM

## 2024-08-19 DIAGNOSIS — K59.00 CONSTIPATION, UNSPECIFIED CONSTIPATION TYPE: Primary | ICD-10-CM

## 2024-08-28 ENCOUNTER — OFFICE VISIT (OUTPATIENT)
Dept: FAMILY MEDICINE CLINIC | Age: 54
End: 2024-08-28
Payer: MEDICAID

## 2024-08-28 VITALS
HEART RATE: 89 BPM | OXYGEN SATURATION: 99 % | HEIGHT: 65 IN | TEMPERATURE: 97.9 F | DIASTOLIC BLOOD PRESSURE: 78 MMHG | SYSTOLIC BLOOD PRESSURE: 121 MMHG | BODY MASS INDEX: 19 KG/M2 | WEIGHT: 114.06 LBS

## 2024-08-28 DIAGNOSIS — R14.0 ABDOMINAL BLOATING: ICD-10-CM

## 2024-08-28 DIAGNOSIS — R19.8 ALTERNATING CONSTIPATION AND DIARRHEA: Primary | ICD-10-CM

## 2024-08-28 DIAGNOSIS — R10.32 LLQ ABDOMINAL PAIN: ICD-10-CM

## 2024-08-28 DIAGNOSIS — R63.4 UNINTENTIONAL WEIGHT LOSS: ICD-10-CM

## 2024-08-28 PROCEDURE — 1159F MED LIST DOCD IN RCRD: CPT

## 2024-08-28 PROCEDURE — 99213 OFFICE O/P EST LOW 20 MIN: CPT

## 2024-08-28 PROCEDURE — 1160F RVW MEDS BY RX/DR IN RCRD: CPT

## 2024-08-28 NOTE — PROGRESS NOTES
Subjective     CHIEF COMPLAINT    Chief Complaint   Patient presents with    Follow-up     Pt is scheduled for CT on Friday but insurance will not approve. Pt needs to be reevaluated. KUB was completed and liver looked to be enlarged.      History of Present Illness  Patient is a 54-year-old female, presenting to the clinic today with complaints of alternating constipation and diarrhea, nausea, left lower quadrant pain.  She also notes abdominal distention after eating.  Has had a cholecystectomy.  She was recently seen by her primary care provider on 8- with the symptoms.  Her primary care provider ordered a KUB which showed hepatomegaly and constipation.  It was recommended for a CT scan of the abdomen and pelvis.  Unfortunately, the CT abdomen pelvis with and without contrast was denied by insurance and pending a peer peer review.  Patient's primary care provider is out of office and unable to complete the peer to peer so she was scheduled with me for further evaluation and management.    Symptoms have been going on approximately 6 months.  She has a prescription for Linzess and reports that she also uses MiraLAX or milk of mag as needed.  Had a colonoscopy in 2020 that just showed internal hemorrhoids.  She reports about 20 pounds of unintentional weight loss in the last year.  Denies any fever or chills.  No blood in the stool.  Reports a family history of liver cancer and stomach cancer.    Review of Systems   Constitutional:  Negative for chills and fever.   Gastrointestinal:  Positive for abdominal distention, abdominal pain, constipation, diarrhea and nausea. Negative for blood in stool.       Past Medical History:   Diagnosis Date    ADHD (attention deficit hyperactivity disorder) 2021    symptoms have progressively worsened in the past 2 years    Allergic     Arthritis 2020    Calculus of kidney     Depression 2021    Anxiety and depression have progressively  become much  worse since the onset of  hyperparathyroidism and/or osteoporosis symptoms.    Fibroid Uterine    Generalized anxiety disorder     History of medical problems 2021    Sporatic multiple-gland disease. Hyperparathyroidism with 3 and a half of the 4  parathyroid glands removed in 2022. Since this condition started, I've had chronic fatigue, chronic muscle/bone pain and worsening insomnia.    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 I've had at least 6 noninjury bone fractures documented in various radiology tests/procedures in 2021 and 2022.    Urinary tract infection             Past Surgical History:   Procedure Laterality Date    COLONOSCOPY  2020    CYSTOSCOPY  2013- Dr Lockhart    ENDOMETRIAL ABLATION  2011    KIDNEY STONE SURGERY  11/2014 stone removal and stent placement    LAPAROSCOPIC CHOLECYSTECTOMY      LASER ABLATION      UTERINE    TUBAL ABDOMINAL LIGATION Bilateral             Family History   Problem Relation Age of Onset    Cancer Mother         Ovarian, uterine cancer    Early death Mother         1997    Miscarriages / Stillbirths Mother         2 stillbirth    Colon cancer Father     Alcohol abuse Father     Anxiety disorder Father     Cancer Father         Stomach cancer    Mental illness Father     Heart attack Maternal Grandmother     Other Maternal Grandmother         Brain Tumor    Breast cancer Maternal Grandmother     Cancer Maternal Grandmother         Breast, brain cancer    Early death Maternal Grandfather         1974    Heart  disease Maternal Grandfather         1974    Stroke Maternal Grandfather         1971    Early death Paternal Grandfather         1972    Heart disease Paternal Grandfather         1972    Cancer Maternal Aunt         Breast cancer            Social History     Socioeconomic History    Marital status: Single    Number of children: 1   Tobacco Use    Smoking status: Former     Current packs/day: 0.00     Average packs/day: 1 pack/day for 10.0 years (10.0 ttl pk-yrs)     Types: Cigarettes     Start date: 2005     Quit date: 2015     Years since quittin.5     Passive exposure: Past    Smokeless tobacco: Never   Vaping Use    Vaping status: Never Used   Substance and Sexual Activity    Alcohol use: Yes     Alcohol/week: 6.0 - 8.0 standard drinks of alcohol     Types: 6 - 8 Glasses of wine per week     Comment: Rarely    Drug use: Never    Sexual activity: Yes     Partners: Male            Allergies   Allergen Reactions    Codeine Unknown - High Severity    Morphine Itching    Sulfa Antibiotics Unknown - High Severity and Other (See Comments)    Sumatriptan Unknown - High Severity            Current Outpatient Medications on File Prior to Visit   Medication Sig Dispense Refill    albuterol sulfate  (90 Base) MCG/ACT inhaler Inhale 2 puffs Every 4 (Four) Hours As Needed.      alendronate (FOSAMAX) 70 MG tablet Take 1 tablet by mouth 1 (One) Time Per Week. 12 tablet 3    amphetamine-dextroamphetamine (ADDERALL) 20 MG tablet Take 1 tablet by mouth 2 (Two) Times a Day.      Calcium Carbonate-Vitamin D (calcium-vitamin D) 500-200 MG-UNIT tablet per tablet Take 1 tablet by mouth Daily.      cyclobenzaprine (FLEXERIL) 10 MG tablet TAKE 1/2 TO 1 (ONE-HALF TO ONE) TABLET BY MOUTH AT BEDTIME AS NEEDED FOR MUSCLE SPASM 20 tablet 0    diazePAM (VALIUM) 5 MG tablet Take 1 tablet by mouth At Night As Needed.      EPINEPHrine (EPIPEN) 0.3 MG/0.3ML solution auto-injector injection Inject 0.3 mL into the appropriate  "muscle as directed by prescriber 1 (One) Time.      estradiol-levonorgestrel (CLIMARAPRO) 0.045-0.015 MG/DAY Place 1 patch on the skin as directed by provider 1 (One) Time Per Week.      fexofenadine (ALLEGRA) 180 MG tablet Take 1 tablet by mouth.      levocetirizine (XYZAL) 5 MG tablet Take 1 tablet by mouth Every Evening. 90 tablet 3    mirtazapine (REMERON) 15 MG tablet Take 1 tablet by mouth Daily With Dinner.      Symbicort 80-4.5 MCG/ACT inhaler Inhale 2 puffs As Needed.      Zavegepant HCl (Zavzpret) 10 MG/ACT solution 1 spray into the nostril(s) as directed by provider Daily As Needed (migraine). (Patient not taking: Reported on 8/28/2024) 6 each 5     No current facility-administered medications on file prior to visit.            /78   Pulse 89   Temp 97.9 °F (36.6 °C) (Oral)   Ht 165.1 cm (65\")   Wt 51.7 kg (114 lb 1 oz)   SpO2 99% Comment: room air  BMI 18.98 kg/m²          Objective     Physical Exam  Vitals and nursing note reviewed.   Constitutional:       General: She is not in acute distress.     Appearance: Normal appearance. She is not ill-appearing.   Eyes:      Extraocular Movements: Extraocular movements intact.      Pupils: Pupils are equal, round, and reactive to light.   Cardiovascular:      Rate and Rhythm: Normal rate and regular rhythm.      Heart sounds: Normal heart sounds. No murmur heard.  Pulmonary:      Effort: Pulmonary effort is normal. No accessory muscle usage or respiratory distress.      Breath sounds: Normal breath sounds. No wheezing or rhonchi.   Abdominal:      General: Bowel sounds are normal.      Palpations: Abdomen is soft.      Tenderness: There is no abdominal tenderness. There is no right CVA tenderness or left CVA tenderness.   Skin:     General: Skin is warm and dry.   Neurological:      General: No focal deficit present.      Mental Status: She is alert and oriented to person, place, and time.   Psychiatric:         Mood and Affect: Mood and affect " normal.         Behavior: Behavior normal.          Assessment & Plan  Alternating constipation and diarrhea    Abdominal bloating    LLQ abdominal pain    Unintentional weight loss      Abdomen is not acute on exam today.  Symptoms have been going on approximately 6 months.  I do feel that she would benefit from a CT of the abdomen and pelvis for further evaluation.  Our referrals department spoke with insurance who stated they would approve CT abdomen pelvis with contrast.  Call was then transferred to me and I spoke with her insurance and i approved the CT abdomen pelvis with contrast.  I have placed that order today.  We will cancel the CT abdomen pelvis with and without contrast order.  Referrals department was able to get patient scheduled for her CT abdomen pelvis with contrast on Friday of this week.  Further plan pending CT results.  She was educated on strict ER precautions at her visit.    Orders Placed This Encounter   Procedures    CT Abdomen Pelvis With Contrast          Follow up:  Return if symptoms worsen or fail to improve.  Patient was given instructions and counseling regarding her condition or for health maintenance advice. Please see specific information pulled into the AVS if appropriate.

## 2024-09-12 ENCOUNTER — HOSPITAL ENCOUNTER (OUTPATIENT)
Dept: CT IMAGING | Facility: HOSPITAL | Age: 54
Discharge: HOME | End: 2024-09-12
Payer: MEDICAID

## 2024-09-12 DIAGNOSIS — R63.4 UNINTENTIONAL WEIGHT LOSS: ICD-10-CM

## 2024-09-12 DIAGNOSIS — R10.32 LLQ ABDOMINAL PAIN: ICD-10-CM

## 2024-09-12 DIAGNOSIS — R19.8 ALTERNATING CONSTIPATION AND DIARRHEA: ICD-10-CM

## 2024-09-12 DIAGNOSIS — R14.0 ABDOMINAL BLOATING: ICD-10-CM

## 2024-09-12 PROCEDURE — 74177 CT ABD & PELVIS W/CONTRAST: CPT

## 2024-09-12 PROCEDURE — 25510000001 IOPAMIDOL PER 1 ML

## 2024-09-12 RX ORDER — IOPAMIDOL 755 MG/ML
100 INJECTION, SOLUTION INTRAVASCULAR
Status: COMPLETED | OUTPATIENT
Start: 2024-09-12 | End: 2024-09-12

## 2024-09-12 RX ADMIN — IOPAMIDOL 100 ML: 755 INJECTION, SOLUTION INTRAVENOUS at 16:33

## 2024-09-13 ENCOUNTER — PROCEDURE VISIT (OUTPATIENT)
Dept: NEUROLOGY | Facility: CLINIC | Age: 54
End: 2024-09-13
Payer: MEDICAID

## 2024-09-13 DIAGNOSIS — G43.719 INTRACTABLE CHRONIC MIGRAINE WITHOUT AURA AND WITHOUT STATUS MIGRAINOSUS: Primary | ICD-10-CM

## 2024-09-23 ENCOUNTER — OFFICE VISIT (OUTPATIENT)
Dept: FAMILY MEDICINE CLINIC | Age: 54
End: 2024-09-23
Payer: MEDICAID

## 2024-09-23 ENCOUNTER — PRIOR AUTHORIZATION (OUTPATIENT)
Dept: NEUROLOGY | Facility: CLINIC | Age: 54
End: 2024-09-23
Payer: MEDICAID

## 2024-09-23 VITALS
DIASTOLIC BLOOD PRESSURE: 79 MMHG | BODY MASS INDEX: 19.33 KG/M2 | HEART RATE: 87 BPM | SYSTOLIC BLOOD PRESSURE: 124 MMHG | TEMPERATURE: 98.1 F | WEIGHT: 116 LBS | OXYGEN SATURATION: 100 % | HEIGHT: 65 IN

## 2024-09-23 DIAGNOSIS — R19.8 ALTERNATING CONSTIPATION AND DIARRHEA: ICD-10-CM

## 2024-09-23 DIAGNOSIS — R10.32 LLQ ABDOMINAL PAIN: ICD-10-CM

## 2024-09-23 DIAGNOSIS — Z12.83 SCREENING EXAM FOR SKIN CANCER: ICD-10-CM

## 2024-09-23 DIAGNOSIS — R14.0 ABDOMINAL BLOATING: Primary | ICD-10-CM

## 2024-09-23 PROCEDURE — 1160F RVW MEDS BY RX/DR IN RCRD: CPT | Performed by: NURSE PRACTITIONER

## 2024-09-23 PROCEDURE — 99213 OFFICE O/P EST LOW 20 MIN: CPT | Performed by: NURSE PRACTITIONER

## 2024-09-23 PROCEDURE — 1159F MED LIST DOCD IN RCRD: CPT | Performed by: NURSE PRACTITIONER

## 2024-09-26 ENCOUNTER — TELEPHONE (OUTPATIENT)
Dept: FAMILY MEDICINE CLINIC | Age: 54
End: 2024-09-26
Payer: MEDICAID

## 2024-10-07 ENCOUNTER — TRANSCRIBE ORDERS (OUTPATIENT)
Dept: ADMINISTRATIVE | Facility: HOSPITAL | Age: 54
End: 2024-10-07
Payer: MEDICAID

## 2024-10-07 DIAGNOSIS — Z12.31 BREAST CANCER SCREENING BY MAMMOGRAM: Primary | ICD-10-CM

## 2024-12-20 DIAGNOSIS — J30.9 ALLERGIC RHINITIS, UNSPECIFIED SEASONALITY, UNSPECIFIED TRIGGER: ICD-10-CM

## 2024-12-23 RX ORDER — LEVOCETIRIZINE DIHYDROCHLORIDE 5 MG/1
5 TABLET, FILM COATED ORAL EVERY EVENING
Qty: 90 TABLET | Refills: 0 | Status: SHIPPED | OUTPATIENT
Start: 2024-12-23

## 2025-01-08 ENCOUNTER — TELEPHONE (OUTPATIENT)
Dept: NEUROLOGY | Facility: CLINIC | Age: 55
End: 2025-01-08
Payer: MEDICAID

## 2025-01-08 NOTE — TELEPHONE ENCOUNTER
SCHEDULING REQUEST    Caller: Josefina Nunez    Relationship to patient: Self    Best call back number: [phone]    Chief complaint: PT NEEDING TO RESCHEDULE HER MISSED BOTOX APPT ON 12/13/24.    Type of visit: BOTOX    If rescheduling, when is the original appointment: 12/13/2024 @ 11:30AM    Requested date: NEXT AVAILABLE    PLEASE REVIEW AND ADVISE.

## 2025-02-28 ENCOUNTER — PROCEDURE VISIT (OUTPATIENT)
Dept: NEUROLOGY | Facility: CLINIC | Age: 55
End: 2025-02-28
Payer: MEDICAID

## 2025-02-28 DIAGNOSIS — G43.719 INTRACTABLE CHRONIC MIGRAINE WITHOUT AURA AND WITHOUT STATUS MIGRAINOSUS: Primary | ICD-10-CM

## 2025-03-13 ENCOUNTER — PRIOR AUTHORIZATION (OUTPATIENT)
Dept: NEUROLOGY | Facility: CLINIC | Age: 55
End: 2025-03-13
Payer: MEDICAID

## 2025-03-13 NOTE — TELEPHONE ENCOUNTER
The request has been approved. The authorization is effective from 03/13/2025 to 03/13/2026, as long as the member is enrolled in their current health plan. A written notification letter will follow with additional details.  Effective Date: 3/13/2025  Authorization Expiration Date: 3/13/2026

## 2025-03-31 ENCOUNTER — OFFICE VISIT (OUTPATIENT)
Dept: FAMILY MEDICINE CLINIC | Age: 55
End: 2025-03-31
Payer: MEDICAID

## 2025-03-31 ENCOUNTER — LAB (OUTPATIENT)
Dept: LAB | Facility: HOSPITAL | Age: 55
End: 2025-03-31
Payer: MEDICAID

## 2025-03-31 VITALS
HEIGHT: 65 IN | BODY MASS INDEX: 18.36 KG/M2 | OXYGEN SATURATION: 100 % | DIASTOLIC BLOOD PRESSURE: 81 MMHG | TEMPERATURE: 98.3 F | WEIGHT: 110.2 LBS | SYSTOLIC BLOOD PRESSURE: 123 MMHG | HEART RATE: 88 BPM

## 2025-03-31 DIAGNOSIS — N64.4 BREAST TENDERNESS IN FEMALE: ICD-10-CM

## 2025-03-31 DIAGNOSIS — K59.00 CONSTIPATION, UNSPECIFIED CONSTIPATION TYPE: ICD-10-CM

## 2025-03-31 DIAGNOSIS — Z13.6 SCREENING FOR CARDIOVASCULAR CONDITION: ICD-10-CM

## 2025-03-31 DIAGNOSIS — R14.0 ABDOMINAL BLOATING: ICD-10-CM

## 2025-03-31 DIAGNOSIS — N63.11 MASS OF UPPER OUTER QUADRANT OF RIGHT BREAST: ICD-10-CM

## 2025-03-31 DIAGNOSIS — R63.4 UNINTENTIONAL WEIGHT LOSS: ICD-10-CM

## 2025-03-31 DIAGNOSIS — R19.8 ALTERNATING CONSTIPATION AND DIARRHEA: ICD-10-CM

## 2025-03-31 DIAGNOSIS — D22.9 ATYPICAL MOLE: ICD-10-CM

## 2025-03-31 DIAGNOSIS — Z00.00 ROUTINE GENERAL MEDICAL EXAMINATION AT A HEALTH CARE FACILITY: Primary | ICD-10-CM

## 2025-03-31 DIAGNOSIS — Z12.83 SCREENING EXAM FOR SKIN CANCER: ICD-10-CM

## 2025-03-31 LAB
ALBUMIN SERPL-MCNC: 4.5 G/DL (ref 3.5–5.2)
ALBUMIN/GLOB SERPL: 1.6 G/DL
ALP SERPL-CCNC: 39 U/L (ref 39–117)
ALT SERPL W P-5'-P-CCNC: 11 U/L (ref 1–33)
ANION GAP SERPL CALCULATED.3IONS-SCNC: 12.5 MMOL/L (ref 5–15)
AST SERPL-CCNC: 24 U/L (ref 1–32)
BILIRUB SERPL-MCNC: 0.3 MG/DL (ref 0–1.2)
BUN SERPL-MCNC: 11 MG/DL (ref 6–20)
BUN/CREAT SERPL: 12.2 (ref 7–25)
CALCIUM SPEC-SCNC: 9.3 MG/DL (ref 8.6–10.5)
CHLORIDE SERPL-SCNC: 102 MMOL/L (ref 98–107)
CHOLEST SERPL-MCNC: 161 MG/DL (ref 0–200)
CO2 SERPL-SCNC: 22.5 MMOL/L (ref 22–29)
CREAT SERPL-MCNC: 0.9 MG/DL (ref 0.57–1)
DEPRECATED RDW RBC AUTO: 39.4 FL (ref 37–54)
EGFRCR SERPLBLD CKD-EPI 2021: 76.1 ML/MIN/1.73
ERYTHROCYTE [DISTWIDTH] IN BLOOD BY AUTOMATED COUNT: 12.6 % (ref 12.3–15.4)
GLOBULIN UR ELPH-MCNC: 2.8 GM/DL
GLUCOSE SERPL-MCNC: 102 MG/DL (ref 65–99)
HCT VFR BLD AUTO: 42.5 % (ref 34–46.6)
HDLC SERPL-MCNC: 72 MG/DL (ref 40–60)
HGB BLD-MCNC: 13.9 G/DL (ref 12–15.9)
LDLC SERPL CALC-MCNC: 81 MG/DL (ref 0–100)
LDLC/HDLC SERPL: 1.14 {RATIO}
MCH RBC QN AUTO: 27.9 PG (ref 26.6–33)
MCHC RBC AUTO-ENTMCNC: 32.7 G/DL (ref 31.5–35.7)
MCV RBC AUTO: 85.2 FL (ref 79–97)
PLATELET # BLD AUTO: 343 10*3/MM3 (ref 140–450)
PMV BLD AUTO: 10.2 FL (ref 6–12)
POTASSIUM SERPL-SCNC: 3.7 MMOL/L (ref 3.5–5.2)
PROT SERPL-MCNC: 7.3 G/DL (ref 6–8.5)
RBC # BLD AUTO: 4.99 10*6/MM3 (ref 3.77–5.28)
SODIUM SERPL-SCNC: 137 MMOL/L (ref 136–145)
TRIGL SERPL-MCNC: 33 MG/DL (ref 0–150)
VLDLC SERPL-MCNC: 8 MG/DL (ref 5–40)
WBC NRBC COR # BLD AUTO: 9.16 10*3/MM3 (ref 3.4–10.8)

## 2025-03-31 PROCEDURE — 36415 COLL VENOUS BLD VENIPUNCTURE: CPT

## 2025-03-31 PROCEDURE — 80061 LIPID PANEL: CPT

## 2025-03-31 PROCEDURE — 85027 COMPLETE CBC AUTOMATED: CPT

## 2025-03-31 PROCEDURE — 80053 COMPREHEN METABOLIC PANEL: CPT

## 2025-03-31 RX ORDER — TRAZODONE HYDROCHLORIDE 50 MG/1
50 TABLET ORAL NIGHTLY
COMMUNITY
Start: 2025-03-11

## 2025-03-31 RX ORDER — DIAZEPAM 5 MG/1
5 TABLET ORAL NIGHTLY PRN
COMMUNITY
Start: 2025-03-24

## 2025-03-31 NOTE — PROGRESS NOTES
Chief Complaint  Josefina Nunez presents to [facility] for Annual Exam      Subjective     History of Present Illness  Josefina is here today for annual exam.   Last annual exam was 1 year  Last eye exam: 2 years  PROVIDER:  Quyen  Last dental exam:   6 months    PROVIDER:  Vini juan   Last menstrual period: n/a - menopausal  Last Completed Pap Smear            Upcoming       PAP SMEAR (Every 3 Years) Next due on 8/1/2026 08/01/2023  Patient-Reported (Performed Externally) - Dr. Fitzgerald    03/13/2021  Done - Dr. Novak                          Last Completed Mammogram            Awaiting Completion       MAMMOGRAM (Every 2 Years) Order placed this encounter      03/31/2025  Order placed for Mammo Diagnostic Digital Tomosynthesis Right With CAD by Amara Garcia APRN    08/24/2023  Mammo Screening Digital Tomosynthesis Bilateral With CAD    09/22/2020  MAMMO SCREENING DIGITAL TOMOSYNTHESIS BILATERAL W CAD    09/22/2020  MAMMO Scan    09/22/2020  [facility] Mammogram component of [facility] MAMMOGRAPHY     Only the first 5 history entries have been loaded, but more history exists.                          Diet / exercise:   No special diet or specific exercise program    10 year cardiovascular risk assessment  The 10-year ASCVD risk score (Sunil DK, et al., 2019) is: 1.1%    Values used to calculate the score:      Age: 55 years      Sex: Female      Is Non- : No      Diabetic: No      Tobacco smoker: No      Systolic Blood Pressure: 123 mmHg      Is BP treated: No      HDL Cholesterol: 68 mg/dL      Total Cholesterol: 165 mg/dL     Josefina Nunez DECLINES OR DEFERS THE FOLLOWING HEALTH MAINTENANCE RECOMMENDATIONS DISCUSSED TODAY:  >Pneumococcal Vaccine and >Covid 19 vaccination      Patient Care Team:  Amara Garcia APRN as PCP - General (Nurse Practitioner)  Trae Levin MD as Consulting Physician  (Endocrinology)  Lyn Lopez APRN (Nurse Practitioner)  Melvi Fitzgerald MD as Consulting Physician (Obstetrics and Gynecology)     She is disappointed that her therapist is leaving the practice and will being a new on 4/15/2025  [facility]    She is also wanting a new referral for dermatology.  She has one on her face and leg.  She does wear sunscreen now but has history of burning.  No history in self of skin cancer.   But several aunts and uncles  with skin cancer     She is also needing a new referral for gastroenterology.  Her last referral she was unable to make the appointment due to a nonworking cell phone.  She continues to have alternating constipation and diarrhea with no improvement with medications.  She has tried Bentyl, colestipol in the past with no longstanding improvement.    She also noticed on her right lateral breast.  She felt the lump and she does realize that she has had a history of cyst however this does not feel like her typical area that she was used to.  She is current on her mammograms but will be coming up due soon.  No skin changes no discoloration, no nipple discharge.  Assessment and Plan     -well balanced diet and exercise as tolerated  -annual wellness exams are recommended  -health care maintenance and gaps in care discussed and orders have been placed as necessary and as willing by the patient.     Diagnoses and all orders for this visit:    1. Routine general medical examination at a health care facility (Primary)    2. Unintentional weight loss  Comments:  referral gastro  Orders:  -     Ambulatory Referral to Gastroenterology    3. Constipation, unspecified constipation type  Comments:  referal gastro  Orders:  -     Ambulatory Referral to Gastroenterology    4. Alternating constipation and diarrhea  Comments:  Refer to gastro  Orders:  -     Ambulatory Referral to Gastroenterology    5. Abdominal bloating  -     Ambulatory Referral to Gastroenterology    6.  "Atypical mole  Comments:  new referral to dermatology  Orders:  -     Ambulatory Referral to Dermatology    7. Breast tenderness in female  Comments:  will get mammogram / US    8. Mass of upper outer quadrant of right breast  Comments:  Ultrasound with mammogram for evaluation  Orders:  -     Mammo Diagnostic Digital Tomosynthesis Right With CAD; Future  -     US Breast Right Limited; Future    9. Screening for cardiovascular condition  -     Lipid panel; Future  -     Comprehensive metabolic panel; Future  -     CBC No Differential; Future    10. Screening exam for skin cancer  -     Ambulatory Referral to Dermatology              Follow Up   Return for Pending imaging results, Pending lab results.  Future Appointments   Date Time Provider Department Center   5/23/2025 10:15 AM Kelle Lozano APRN [facility]   4/6/2026  3:00 PM Amara Garcia APRN [facility]       No orders of the defined types were placed in this encounter.      There are no discontinued medications.     Review of Systems    Objective     Vitals:    03/31/25 1514   BP: 123/81   BP Location: Left arm   Patient Position: Sitting   Cuff Size: Adult   Pulse: 88   Temp: 98.3 °F (36.8 °C)   TempSrc: Oral   SpO2: 100%   Weight: 50 kg (110 lb 3.2 oz)   Height: 165.1 cm (65\")     Body mass index is 18.34 kg/m².       Physical Exam  Vitals reviewed.   Constitutional:       Appearance: Normal appearance. She is well-developed. She is not ill-appearing.   HENT:      Head: Normocephalic and atraumatic.      Right Ear: Tympanic membrane, ear canal and external ear normal.      Left Ear: Tympanic membrane, ear canal and external ear normal.      Mouth/Throat:      Lips: Pink.      Mouth: Mucous membranes are moist.      Pharynx: Oropharynx is clear. Uvula midline. No oropharyngeal exudate.   Eyes:      Extraocular Movements: Extraocular movements intact.      Conjunctiva/sclera: Conjunctivae normal.      Pupils: Pupils are equal, round, and " reactive to light.   Neck:      Thyroid: No thyromegaly.   Cardiovascular:      Rate and Rhythm: Normal rate and regular rhythm.      Heart sounds: No murmur heard.     No friction rub. No gallop.   Pulmonary:      Effort: Pulmonary effort is normal.      Breath sounds: Normal breath sounds. No wheezing or rhonchi.   Chest:       Abdominal:      General: Bowel sounds are increased. There is no distension.      Palpations: Abdomen is soft.      Tenderness: There is abdominal tenderness (generalized tenderness).   Musculoskeletal:      Cervical back: Normal range of motion.   Lymphadenopathy:      Head:      Right side of head: No submandibular adenopathy.      Left side of head: No submandibular adenopathy.      Cervical: No cervical adenopathy.      Upper Body:      Right upper body: No supraclavicular, axillary or pectoral adenopathy.      Left upper body: No supraclavicular, axillary or pectoral adenopathy.   Skin:     General: Skin is warm and dry.      Findings: No lesion or rash.   Neurological:      Mental Status: She is alert and oriented to person, place, and time.      Cranial Nerves: No cranial nerve deficit.      Gait: Gait is intact.   Psychiatric:         Mood and Affect: Mood and affect normal.         Behavior: Behavior normal.         Thought Content: Thought content normal.         Judgment: Judgment normal.                 Result Review          Allergies   Allergen Reactions    Codeine Unknown - High Severity    Morphine Itching    Sulfa Antibiotics Unknown - High Severity and Other (See Comments)    Sumatriptan Unknown - High Severity      Past Medical History:   Diagnosis Date    ADHD (attention deficit hyperactivity disorder) 2021    symptoms have progressively worsened in the past 2 years    Allergic     Arthritis 2020    Calculus of kidney     Depression 2021    Anxiety and depression have progressively  become much  worse since the onset of hyperparathyroidism and/or osteoporosis symptoms.     Fibroid Uterine    Generalized anxiety disorder     History of medical problems 2021    Sporatic multiple-gland disease. Hyperparathyroidism with 3 and a half of the 4  parathyroid glands removed in 2022. Since this condition started, I've had chronic fatigue, chronic muscle/bone pain and worsening insomnia.    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 I've had at least 6 noninjury bone fractures documented in various radiology tests/procedures in 2021 and 2022.    Urinary tract infection      Current Outpatient Medications   Medication Sig Dispense Refill    albuterol sulfate  (90 Base) MCG/ACT inhaler Inhale 2 puffs Every 4 (Four) Hours As Needed.      alendronate (FOSAMAX) 70 MG tablet Take 1 tablet by mouth 1 (One) Time Per Week. 12 tablet 3    amphetamine-dextroamphetamine (ADDERALL) 20 MG tablet Take 1 tablet by mouth 2 (Two) Times a Day.      Calcium Carbonate-Vitamin D (calcium-vitamin D) 500-200 MG-UNIT tablet per tablet Take 1 tablet by mouth Daily.      diazePAM (VALIUM) 5 MG tablet Take 1 tablet by mouth At Night As Needed.      EPINEPHrine (EPIPEN) 0.3 MG/0.3ML solution auto-injector injection Inject 0.3 mL into the appropriate muscle as directed by prescriber 1 (One) Time.      estradiol-levonorgestrel (CLIMARAPRO) 0.045-0.015 MG/DAY Place 1 patch on the skin as directed by provider 1 (One) Time Per Week.      levocetirizine (XYZAL) 5 MG tablet TAKE 1 TABLET BY MOUTH ONCE DAILY IN THE EVENING 90 tablet 0     mirtazapine (REMERON) 15 MG tablet Take 1 tablet by mouth Daily With Dinner.      Symbicort 80-4.5 MCG/ACT inhaler Inhale 2 puffs As Needed.      traZODone (DESYREL) 50 MG tablet Take 1 tablet by mouth Every Night.       No current facility-administered medications for this visit.     Past Surgical History:   Procedure Laterality Date    COLONOSCOPY  2020    CYSTOSCOPY  2013- Dr Lockhart    ENDOMETRIAL ABLATION  2011    KIDNEY STONE SURGERY  11/2014 stone removal and stent placement    LAPAROSCOPIC CHOLECYSTECTOMY      LASER ABLATION      UTERINE    TUBAL ABDOMINAL LIGATION Bilateral       There are no preventive care reminders to display for this patient.     Immunization History   Administered Date(s) Administered    COVID-19 (MODERNA) 1st,2nd,3rd Dose Monovalent 12/01/2021, 12/29/2021    COVID-19 (UNSPECIFIED) 12/01/2021, 12/29/2021    Covid-19 (Pfizer) Gray Cap Monovalent 05/24/2022    PPD Test 02/11/2009, 02/11/2009         Part of this note may be an electronic transcription/translation of spoken language to printed   text using the Dragon Dictation System.      MIGDALIA Carlisle

## 2025-04-30 ENCOUNTER — HOSPITAL ENCOUNTER (OUTPATIENT)
Dept: MAMMOGRAPHY | Facility: HOSPITAL | Age: 55
Discharge: HOME | End: 2025-04-30
Payer: MEDICAID

## 2025-04-30 ENCOUNTER — HOSPITAL ENCOUNTER (OUTPATIENT)
Dept: ULTRASOUND IMAGING | Facility: HOSPITAL | Age: 55
Discharge: HOME | End: 2025-04-30
Payer: MEDICAID

## 2025-04-30 DIAGNOSIS — N63.11 MASS OF UPPER OUTER QUADRANT OF RIGHT BREAST: ICD-10-CM

## 2025-04-30 PROCEDURE — 77066 DX MAMMO INCL CAD BI: CPT

## 2025-04-30 PROCEDURE — G0279 TOMOSYNTHESIS, MAMMO: HCPCS

## 2025-04-30 PROCEDURE — 76642 ULTRASOUND BREAST LIMITED: CPT

## 2025-05-16 ENCOUNTER — TELEPHONE (OUTPATIENT)
Dept: NEUROLOGY | Facility: CLINIC | Age: 55
End: 2025-05-16
Payer: MEDICAID

## 2025-05-21 NOTE — TELEPHONE ENCOUNTER
Caller: Josefina Nunez    Relationship: Self    Best call back number: [phone]    What was the call regarding: PER ANA LILIA, ADVISED PT THAT MIGDALIA PADILLA DOES NOT HAVE ANY LATER TIME SLOTS AVAILABLE ON FRIDAY. PT VERBALIZED UNDERSTANDING.    DOCUMENTING PER PROTOCOL.

## 2025-05-23 ENCOUNTER — PROCEDURE VISIT (OUTPATIENT)
Dept: NEUROLOGY | Facility: CLINIC | Age: 55
End: 2025-05-23
Payer: MEDICAID

## 2025-05-23 DIAGNOSIS — G43.719 INTRACTABLE CHRONIC MIGRAINE WITHOUT AURA AND WITHOUT STATUS MIGRAINOSUS: Primary | ICD-10-CM

## 2025-08-04 ENCOUNTER — PRIOR AUTHORIZATION (OUTPATIENT)
Dept: NEUROLOGY | Facility: CLINIC | Age: 55
End: 2025-08-04
Payer: MEDICAID

## 2025-08-15 ENCOUNTER — PROCEDURE VISIT (OUTPATIENT)
Dept: NEUROLOGY | Facility: CLINIC | Age: 55
End: 2025-08-15
Payer: MEDICAID

## 2025-08-15 DIAGNOSIS — G43.719 INTRACTABLE CHRONIC MIGRAINE WITHOUT AURA AND WITHOUT STATUS MIGRAINOSUS: Primary | ICD-10-CM

## 2025-08-15 PROCEDURE — 64615 CHEMODENERV MUSC MIGRAINE: CPT | Performed by: NURSE PRACTITIONER

## 2025-08-18 ENCOUNTER — OFFICE VISIT (OUTPATIENT)
Dept: GASTROENTEROLOGY | Facility: CLINIC | Age: 55
End: 2025-08-18
Payer: MEDICAID

## 2025-08-18 VITALS
OXYGEN SATURATION: 100 % | SYSTOLIC BLOOD PRESSURE: 128 MMHG | HEART RATE: 98 BPM | RESPIRATION RATE: 16 BRPM | HEIGHT: 65 IN | DIASTOLIC BLOOD PRESSURE: 79 MMHG | WEIGHT: 106.6 LBS | BODY MASS INDEX: 17.76 KG/M2 | TEMPERATURE: 98.4 F

## 2025-08-18 DIAGNOSIS — K92.1 BLOOD IN STOOL: ICD-10-CM

## 2025-08-18 DIAGNOSIS — R09.89 THROAT CLEARING: ICD-10-CM

## 2025-08-18 DIAGNOSIS — R63.4 WEIGHT LOSS, ABNORMAL: Primary | ICD-10-CM

## 2025-08-18 DIAGNOSIS — K59.00 CONSTIPATION, UNSPECIFIED CONSTIPATION TYPE: ICD-10-CM

## 2025-08-18 DIAGNOSIS — R14.0 ABDOMINAL BLOATING: ICD-10-CM

## 2025-08-18 DIAGNOSIS — E21.3 HYPERPARATHYROIDISM: ICD-10-CM

## 2025-08-18 DIAGNOSIS — Z90.49 HISTORY OF CHOLECYSTECTOMY: ICD-10-CM

## 2025-08-18 DIAGNOSIS — K21.00 GASTROESOPHAGEAL REFLUX DISEASE WITH ESOPHAGITIS WITHOUT HEMORRHAGE: ICD-10-CM

## 2025-08-18 PROCEDURE — 99214 OFFICE O/P EST MOD 30 MIN: CPT | Performed by: NURSE PRACTITIONER

## 2025-08-18 PROCEDURE — 1160F RVW MEDS BY RX/DR IN RCRD: CPT | Performed by: NURSE PRACTITIONER

## 2025-08-18 PROCEDURE — 1159F MED LIST DOCD IN RCRD: CPT | Performed by: NURSE PRACTITIONER

## 2025-08-18 RX ORDER — BUPROPION HYDROCHLORIDE 100 MG/1
100 TABLET ORAL EVERY MORNING
COMMUNITY
Start: 2025-06-26

## 2025-08-18 RX ORDER — PEG-3350, SODIUM SULFATE, SODIUM CHLORIDE, POTASSIUM CHLORIDE, SODIUM ASCORBATE AND ASCORBIC ACID 7.5-2.691G
1000 KIT ORAL EVERY 12 HOURS
Qty: 2000 ML | Refills: 0 | Status: SHIPPED | OUTPATIENT
Start: 2025-08-18

## 2025-08-19 ENCOUNTER — PATIENT ROUNDING (BHMG ONLY) (OUTPATIENT)
Dept: GASTROENTEROLOGY | Facility: CLINIC | Age: 55
End: 2025-08-19
Payer: MEDICAID

## 2025-08-24 DIAGNOSIS — J30.9 ALLERGIC RHINITIS, UNSPECIFIED SEASONALITY, UNSPECIFIED TRIGGER: ICD-10-CM

## 2025-08-25 ENCOUNTER — TELEPHONE (OUTPATIENT)
Dept: GASTROENTEROLOGY | Facility: CLINIC | Age: 55
End: 2025-08-25
Payer: MEDICAID

## 2025-08-26 RX ORDER — LEVOCETIRIZINE DIHYDROCHLORIDE 5 MG/1
5 TABLET, FILM COATED ORAL EVERY EVENING
Qty: 90 TABLET | Refills: 0 | Status: SHIPPED | OUTPATIENT
Start: 2025-08-26